# Patient Record
Sex: MALE | Race: WHITE | NOT HISPANIC OR LATINO | Employment: FULL TIME | URBAN - METROPOLITAN AREA
[De-identification: names, ages, dates, MRNs, and addresses within clinical notes are randomized per-mention and may not be internally consistent; named-entity substitution may affect disease eponyms.]

---

## 2020-01-03 DIAGNOSIS — IMO0001 HORMONAL IMBALANCE IN TRANSGENDER PATIENT: ICD-10-CM

## 2020-01-03 DIAGNOSIS — Z79.899 HIGH RISK MEDICATION USE: Primary | ICD-10-CM

## 2020-01-08 LAB
ALBUMIN SERPL-MCNC: 4.7 G/DL (ref 3.5–5.5)
ALBUMIN/GLOB SERPL: 2.5 {RATIO} (ref 1.2–2.2)
ALP SERPL-CCNC: 52 IU/L (ref 39–117)
ALT SERPL-CCNC: 11 IU/L (ref 0–44)
AST SERPL-CCNC: 14 IU/L (ref 0–40)
BILIRUB SERPL-MCNC: 0.4 MG/DL (ref 0–1.2)
BUN SERPL-MCNC: 12 MG/DL (ref 6–20)
BUN/CREAT SERPL: 14 (ref 9–20)
CALCIUM SERPL-MCNC: 9.1 MG/DL (ref 8.7–10.2)
CHLORIDE SERPL-SCNC: 102 MMOL/L (ref 96–106)
CHOLEST SERPL-MCNC: 173 MG/DL (ref 100–199)
CO2 SERPL-SCNC: 27 MMOL/L (ref 20–29)
CREAT SERPL-MCNC: 0.83 MG/DL (ref 0.76–1.27)
ERYTHROCYTE [DISTWIDTH] IN BLOOD BY AUTOMATED COUNT: 12.7 % (ref 11.6–15.4)
GLOBULIN SER CALC-MCNC: 1.9 G/DL (ref 1.5–4.5)
GLUCOSE SERPL-MCNC: 84 MG/DL (ref 65–99)
HCT VFR BLD AUTO: 42.3 % (ref 37.5–51)
HDLC SERPL-MCNC: 63 MG/DL
HGB BLD-MCNC: 14.6 G/DL (ref 13–17.7)
LDLC SERPL CALC-MCNC: 94 MG/DL (ref 0–99)
LDLC/HDLC SERPL: 1.5 RATIO (ref 0–3.6)
MCH RBC QN AUTO: 30.5 PG (ref 26.6–33)
MCHC RBC AUTO-ENTMCNC: 34.5 G/DL (ref 31.5–35.7)
MCV RBC AUTO: 89 FL (ref 79–97)
PLATELET # BLD AUTO: 237 X10E3/UL (ref 150–450)
POTASSIUM SERPL-SCNC: 3.9 MMOL/L (ref 3.5–5.2)
PROT SERPL-MCNC: 6.6 G/DL (ref 6–8.5)
RBC # BLD AUTO: 4.78 X10E6/UL (ref 4.14–5.8)
SODIUM SERPL-SCNC: 144 MMOL/L (ref 134–144)
TESTOST SERPL-MCNC: 547 NG/DL (ref 264–916)
TRIGL SERPL-MCNC: 79 MG/DL (ref 0–149)
VLDLC SERPL CALC-MCNC: 16 MG/DL (ref 5–40)
WBC # BLD AUTO: 5.6 X10E3/UL (ref 3.4–10.8)

## 2020-01-16 ENCOUNTER — OFFICE VISIT (OUTPATIENT)
Dept: FAMILY MEDICINE CLINIC | Facility: CLINIC | Age: 40
End: 2020-01-16

## 2020-01-16 VITALS
DIASTOLIC BLOOD PRESSURE: 62 MMHG | BODY MASS INDEX: 18.78 KG/M2 | WEIGHT: 110 LBS | SYSTOLIC BLOOD PRESSURE: 102 MMHG | OXYGEN SATURATION: 99 % | HEIGHT: 64 IN | RESPIRATION RATE: 16 BRPM | HEART RATE: 64 BPM

## 2020-01-16 DIAGNOSIS — IMO0001 HORMONAL IMBALANCE IN TRANSGENDER PATIENT: ICD-10-CM

## 2020-01-16 DIAGNOSIS — R06.83 SNORING: Primary | ICD-10-CM

## 2020-01-16 DIAGNOSIS — M25.552 PAIN OF LEFT HIP JOINT: ICD-10-CM

## 2020-01-16 PROBLEM — K21.9 GASTROESOPHAGEAL REFLUX DISEASE: Status: ACTIVE | Noted: 2020-01-16

## 2020-01-16 PROCEDURE — 99203 OFFICE O/P NEW LOW 30 MIN: CPT | Performed by: FAMILY MEDICINE

## 2020-01-16 RX ORDER — PANTOPRAZOLE SODIUM 40 MG/1
40 TABLET, DELAYED RELEASE ORAL DAILY
COMMUNITY
Start: 2015-06-29 | End: 2020-04-10 | Stop reason: SDUPTHER

## 2020-01-16 RX ORDER — MELATONIN 10 MG
CAPSULE ORAL
COMMUNITY
End: 2020-03-10

## 2020-01-16 RX ORDER — FINASTERIDE 5 MG/1
5 TABLET, FILM COATED ORAL DAILY
COMMUNITY
Start: 2018-06-18 | End: 2020-08-03 | Stop reason: SDUPTHER

## 2020-01-16 NOTE — PROGRESS NOTES
Alexander Khalil is a 39 y.o. male. Pt is a new pt for the clinic and actual  pt ans is here for Hormone therapy replacement     Chief Complaint   Patient presents with   • hormone therapy replacement       HPI     Here to establish care and to discuss couple issues.  Just got his dose of Depo testosterone this morning.  Typically gets 1 every 3 months or so.  Trying to figure out what to do next as he had [place].  We have wrestled with insurance to try to get them to [place] it [place] however we have not had any LOC.    Concerned about possible sleep apnea.  Has been told by his wife that he snores heavily and occasionally stops breathing overnight.  Is never had a sleep study.  Has fair amount of daily fatigue, rare morning headaches.  Does have short sleep latency.    Has had some recent low back pain related to the placement and removal of an InterStim device for chronic urinary urgency.  Has a low decent sized scar on his left buttock that hurts intermittently especially with extremes of hip flexion, like getting in and out of a car.  Feels it is slowly healing, though it has interfered with his walking routine.  Typically walks 1 hour every evening.  Is found this very beneficial to his mental health.    The following portions of the patient's history were reviewed and updated as appropriate: allergies, current medications, past family history, past medical history, past social history, past surgical history and problem list.    Review of Systems   Constitutional: Negative for chills, fatigue, fever and unexpected weight change.   HENT: Negative for sore throat.    Respiratory: Negative for cough, shortness of breath and wheezing.    Cardiovascular: Negative for chest pain, palpitations and leg swelling.   Gastrointestinal: Negative for abdominal distention, abdominal pain, constipation, diarrhea, nausea and vomiting.   Genitourinary: Positive for urgency.  Negative for dysuria.   Musculoskeletal: Negative for arthralgias and myalgias.   Skin: Positive for wound. Negative for rash.   Neurological: Negative for dizziness.   Psychiatric/Behavioral: Negative for confusion. The patient is nervous/anxious.      I have reviewed and confirmed the ROS as documented by the MA. Yossi Vincent MD    Objective  Vitals:    01/16/20 1144   BP: 102/62   Pulse: 64   Resp: 16   SpO2: 99%     Body mass index is 18.88 kg/m².    Physical Exam   Constitutional: He is oriented to person, place, and time. He appears well-developed and well-nourished. No distress.   Eyes: Pupils are equal, round, and reactive to light. EOM are normal.   Neck: Normal range of motion. Neck supple.   Cardiovascular: Normal rate, regular rhythm, normal heart sounds and intact distal pulses.   No murmur heard.  Pulmonary/Chest: Effort normal and breath sounds normal. No respiratory distress. He has no wheezes.   Abdominal: Soft. Bowel sounds are normal. There is no tenderness. There is no rebound and no guarding.   Musculoskeletal: Normal range of motion.   Slight tenderness lateral L hip, good ROM, strength intact   Neurological: He is alert and oriented to person, place, and time.   Skin: Skin is warm and dry.        Two well-healing surgical scars with surgical glue overlying, slight tenderness, no erythema or edema   Psychiatric: He has a normal mood and affect. His behavior is normal.   Nursing note and vitals reviewed.        Current Outpatient Medications:   •  finasteride (PROSCAR) 5 MG tablet, Take 5 mg by mouth Daily., Disp: , Rfl:   •  Melatonin 10 MG capsule, Take  by mouth., Disp: , Rfl:   •  pantoprazole (PROTONIX) 40 MG EC tablet, Take 40 mg by mouth Daily., Disp: , Rfl:   •  Testosterone Undecanoate 750 MG/3ML solution, Inject  into the appropriate muscle as directed by prescriber. Injects once every 3 months, Disp: , Rfl:   Current outpatient and discharge medications have been reconciled  for the patient.  Reviewed by: Yossi Vincent MD      Procedures    Lab Results (most recent)     None                  Alexander was seen today for hormone therapy replacement.    Diagnoses and all orders for this visit:    Snoring  -     Ambulatory Referral to Sleep Medicine    Hormonal imbalance in transgender patient    Pain of left hip joint    Orders as above.  Told him to expect someone helping him schedule that within a week or so.    Recommended slow return to activity.    May consider trying another prior authorization with insurance for double testosterone.    Any information loaded into the AVS was placed there by ARELIS Vincent MD, and patient was counseled on the same.   No follow-ups on file.      ARELIS Vincent MD

## 2020-03-04 ENCOUNTER — OFFICE VISIT (OUTPATIENT)
Dept: SLEEP MEDICINE | Facility: HOSPITAL | Age: 40
End: 2020-03-04

## 2020-03-04 VITALS
HEIGHT: 64 IN | DIASTOLIC BLOOD PRESSURE: 70 MMHG | HEART RATE: 63 BPM | SYSTOLIC BLOOD PRESSURE: 107 MMHG | WEIGHT: 116 LBS | BODY MASS INDEX: 19.81 KG/M2 | OXYGEN SATURATION: 98 %

## 2020-03-04 DIAGNOSIS — G47.30 SLEEP APNEA, UNSPECIFIED TYPE: Primary | ICD-10-CM

## 2020-03-04 DIAGNOSIS — G47.21 CIRCADIAN RHYTHM SLEEP DISORDER, DELAYED SLEEP PHASE TYPE: ICD-10-CM

## 2020-03-04 DIAGNOSIS — G47.10 HYPERSOMNIA: ICD-10-CM

## 2020-03-04 DIAGNOSIS — G47.8 NON-RESTORATIVE SLEEP: ICD-10-CM

## 2020-03-04 DIAGNOSIS — G47.53 RECURRENT ISOLATED SLEEP PARALYSIS: ICD-10-CM

## 2020-03-04 DIAGNOSIS — R06.83 SNORING: ICD-10-CM

## 2020-03-04 PROCEDURE — 99213 OFFICE O/P EST LOW 20 MIN: CPT | Performed by: FAMILY MEDICINE

## 2020-03-04 PROCEDURE — G0463 HOSPITAL OUTPT CLINIC VISIT: HCPCS

## 2020-03-04 NOTE — PROGRESS NOTES
[facility] New Patient/Consultation       Reason for Consultation: Snoring witnessed apneas      Patient Care Team:  Yossi Vincent MD as PCP - General (Family Medicine)  Suzan Roberts MD as Consulting Physician (Sleep Medicine)      History of present illness:  Thank you for asking me to see your patient.  The patient is a 39 y.o. male with past medical history of GERD presents today with concern for sleep disorder due to snoring and witnessed apneas.  Denies history of prior sleep study.  Uses melatonin to help him sleep at night sometimes.  No history of tonsillectomy.  Patient reports self diagnosed delayed circadian rhythm disorder.  He will naturally go to sleep later than most people around 1 AM or 2 AM and will naturally like to wake up later around 9 AM.  In the past he started adding walking to his routine and help reset her circadian rhythm where he was sleeping from 11 PM to 7 AM.  He recently had some shoulder issues/surgery and since then has been hard to reset his circadian rhythm.  He does have a light box which he does not use regularly.  He does note hypersomnia and nonrestorative sleep.  He can fall asleep pretty easily at any time.  Does report sudden episodes of sleep sometimes; he is falling asleep during place and shows that he was really enjoying.  He does report some sleep paralysis episodes.  He also reports when he has a lot of anxiety/panic attack feels like his knees buckle at times.  No family history of narcolepsy that he can recall.    Sleep Schedule:  Bed time: Varies usually 1 AM; weekends sometimes 2 AM  Sleep latency: 10 minutes  Wake time: Weekdays 7 AM to 8 AM; weekends 9 AM  Average hours slept: 6-7  Non-restorative sleep: Sometimes if less than 7 hours of sleep  Number of naps per day: 1  Rotating shifts?:  No  Nocturia: N  Electronics in bedroom: Y    Excessive daytime sleepiness or drowsiness:Y  Any accidents at work due to sleepiness in the last 5  "years:SOMETIMES  Any difficulty driving due to sleepiness or being drowsy: SOMETIMES  Weight changed in the last 5 years:Y - LOST 1 5 LBS    Snoring:Y  Witnessed apneas:Y  Have you ever awakened gasping for breath, coughing, choking or respiratory discomfort: N  Morning headaches: N  Awaken with a sore throat or dry mouth: N    Any reports of leg jerking at night: N  Urge sensations: N  Does pain disrupt sleep: N  Sweating during sleep: N  Teeth grinding: Y    Any sudden episodes of sleep during the day: N  Sleep paralysis/hallucinations: Y - PARALYSIS  Muscle weakness with laughing/anger: N  Nightmares: N  Sleep walking: N    Are you sleepy when you increase your sleep time: N  Do you sleep better away from your own bed: N    ESS: 6    Social History: Professor; 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; 2 caffeinated beverages a day    Review of Systems:    A complete review of systems was done and all were negative with the exception of frequent urination joint pain anxiety frequent heartburn    Allergies:  Fish-derived products; Sulfa antibiotics; and Latex    Family History: TWYLA no       Current Outpatient Medications:   •  finasteride (PROSCAR) 5 MG tablet, Take 5 mg by mouth Daily., Disp: , Rfl:   •  Melatonin 10 MG capsule, Take  by mouth., Disp: , Rfl:   •  pantoprazole (PROTONIX) 40 MG EC tablet, Take 40 mg by mouth Daily., Disp: , Rfl:   •  Testosterone Undecanoate 750 MG/3ML solution, Inject  into the appropriate muscle as directed by prescriber. Injects once every 3 months, Disp: , Rfl:     Vital Signs:    Vitals:    03/04/20 0820   BP: 107/70   Pulse: 63   SpO2: 98%   Weight: 52.6 kg (116 lb)   Height: 162.6 cm (64\")      Body mass index is 19.91 kg/m².  Neck Circumference: 12.5 inches      Physical Exam:   General Alert and oriented. No acute distress noted   Pharynx/Throat Class II Mallampati airway, large tongue, no evidence of redundant lateral pharyngeal tissue. No oral lesions. No thrush. Moist mucous " membranes.   Head Normocephalic. Symmetrical. Atraumatic.    Nose No septal deviation. No drainage   Chest Wall Normal shape. Symmetric expansion with respiration. No tenderness.   Neck Trachea midline, no thyromegaly or adenopathy    Lungs Clear to auscultation bilaterally. No wheezes. No rhonchi. No rales. Respirations regular, even and unlabored.   Heart Regular rhythm and normal rate. Normal S1 and S2. No murmur   Abdomen Soft, non-tender and non-distended. Normal bowel sounds. No masses.   Extremities Moves all extremities well. No edema   Psychiatric Normal mood and affect.       Impression:  1. Sleep apnea, unspecified type    2. Snoring    3. Non-restorative sleep    4. Hypersomnia    5. Recurrent isolated sleep paralysis    6. Circadian rhythm sleep disorder, delayed sleep phase type        Plan:    Good sleep hygiene measures should be maintained.      I discussed the pathophysiology of obstructive sleep apnea with the patient.  We discussed the adverse outcomes associated with untreated sleep-disordered breathing.  We discussed treatment modalities of obstructive sleep apnea including CPAP device as well as oral mandibular advancement device. Sleep study will be scheduled to establish definitive diagnosis of sleep disorder breathing.  Weight loss will be strongly beneficial in order to reduce the severity of sleep-disordered breathing.  Patient has narrow oropharyngeal structure.  Caution during activities that require prolonged concentration is strongly advised.  Patient will be notified of sleep study results after sleep study is completed.  If sleep apnea is only mild,  oral mandibular advancement device may be one of the treatment options.  However if sleep apnea is moderately severe, CPAP treatment will be strongly encouraged.  The patient is not opposed to treatment with CPAP device if we confirm significant obstructive sleep apnea on polysomnography.    Primary concern is snoring and witnessed  apneas.  Therefore we will proceed with home sleep study for now to rule out obstructive sleep apnea.  There is also component of his delayed type circadian rhythm disorder contributing to his symptoms.  Discussed resetting circadian rhythm with the help of melatonin and light box to adjust his core body temperature minimum.  Patient will work on this.    If sleep study negative for obstructive sleep apnea and still having difficulty with adjusting his circadian rhythm, will consider in lab M SLT.    Thank you for allowing me to participate in your patient's care.    Suzan Roberts MD  Sleep Medicine  03/04/20  9:00 AM

## 2020-03-10 RX ORDER — MELATONIN 10 MG
5 CAPSULE ORAL
COMMUNITY
End: 2022-12-29

## 2020-03-25 ENCOUNTER — APPOINTMENT (OUTPATIENT)
Dept: SLEEP MEDICINE | Facility: HOSPITAL | Age: 40
End: 2020-03-25

## 2020-04-10 DIAGNOSIS — IMO0001 HORMONAL IMBALANCE IN TRANSGENDER PATIENT: Primary | ICD-10-CM

## 2020-04-10 RX ORDER — PANTOPRAZOLE SODIUM 40 MG/1
40 TABLET, DELAYED RELEASE ORAL DAILY
Qty: 90 TABLET | Refills: 3 | Status: SHIPPED | OUTPATIENT
Start: 2020-04-10 | End: 2021-04-05

## 2020-04-16 ENCOUNTER — TELEPHONE (OUTPATIENT)
Dept: FAMILY MEDICINE CLINIC | Facility: CLINIC | Age: 40
End: 2020-04-16

## 2020-04-27 ENCOUNTER — TELEPHONE (OUTPATIENT)
Dept: FAMILY MEDICINE CLINIC | Facility: CLINIC | Age: 40
End: 2020-04-27

## 2020-04-27 NOTE — TELEPHONE ENCOUNTER
I'm confused. We filled out a PA, right? Does that mean they filled one dose and are requiring another PA for the next dose? Can you please contact his insurance to figure out exactly what's needed?

## 2020-04-27 NOTE — TELEPHONE ENCOUNTER
MIGUEL FROM [facility] CALLING AND STATES THE Testosterone Enanthate (Xyosted) 50 MG/0.5ML solution auto-injector WILL NEED A PRIOR AUTH BEFORE THE NEXT REFILL.     MIGUEL CAN BE REACHED [phone].

## 2020-08-03 RX ORDER — FINASTERIDE 5 MG/1
5 TABLET, FILM COATED ORAL DAILY
Qty: 90 TABLET | Refills: 3 | Status: SHIPPED | OUTPATIENT
Start: 2020-08-03 | End: 2021-07-29

## 2020-08-19 DIAGNOSIS — IMO0001 HORMONAL IMBALANCE IN TRANSGENDER PATIENT: ICD-10-CM

## 2020-11-05 DIAGNOSIS — Z20.822 EXPOSURE TO COVID-19 VIRUS: Primary | ICD-10-CM

## 2021-02-09 ENCOUNTER — OFFICE VISIT (OUTPATIENT)
Dept: FAMILY MEDICINE CLINIC | Facility: CLINIC | Age: 41
End: 2021-02-09

## 2021-02-09 VITALS
RESPIRATION RATE: 14 BRPM | TEMPERATURE: 96.9 F | HEIGHT: 64 IN | BODY MASS INDEX: 21 KG/M2 | HEART RATE: 97 BPM | WEIGHT: 123 LBS | DIASTOLIC BLOOD PRESSURE: 84 MMHG | SYSTOLIC BLOOD PRESSURE: 128 MMHG

## 2021-02-09 DIAGNOSIS — F42.2 MIXED OBSESSIONAL THOUGHTS AND ACTS: ICD-10-CM

## 2021-02-09 DIAGNOSIS — IMO0001 HORMONAL IMBALANCE IN TRANSGENDER PATIENT: Primary | ICD-10-CM

## 2021-02-09 DIAGNOSIS — Z51.81 THERAPEUTIC DRUG MONITORING: ICD-10-CM

## 2021-02-09 DIAGNOSIS — F41.1 GENERALIZED ANXIETY DISORDER: ICD-10-CM

## 2021-02-09 PROCEDURE — 99214 OFFICE O/P EST MOD 30 MIN: CPT | Performed by: FAMILY MEDICINE

## 2021-02-09 RX ORDER — KETOCONAZOLE 20 MG/ML
SHAMPOO TOPICAL
COMMUNITY
Start: 2020-12-28

## 2021-02-09 RX ORDER — CETIRIZINE HYDROCHLORIDE 10 MG/1
TABLET ORAL
COMMUNITY
Start: 2015-10-01

## 2021-02-09 NOTE — PROGRESS NOTES
"Chief Complaint  Medication Problem    Subjective    History of Present Illness {CC  Problem List  Visit  Diagnosis   Encounters  Notes  Medications  Labs  Result Review Imaging  Media :23}     Alexander Khalil presents to [facility] for Medication Problem.  History of Present Illness     Here today for general follow-up.  Due for a check of labs today.  Feels that he is doing well overall.  Anxiety has been somewhat high.  Has also noticed more obsessive thoughts of late.  These seem most focused around money and hygiene.  Have been various other topics in the past.  Seeing a therapist, finds it useful, but current therapist does not have modalities to necessarily help with OCD issues.      Has been on escitalopram in the past but this caused some urinary retention which compounded existing urinary problems.  Has not been on any other medication since that time.  Has been somewhat reluctant to retry though acknowledges that the mirabegron seems to be helping much more than any previous therapies.    Continues on Xyosted for gender affirming hormone therapy.  Tolerating it well, no problems with injections.  Due for labs as above.    Objective     Vital Signs:   /84   Pulse 97   Temp 96.9 °F (36.1 °C)   Resp 14   Ht 162 cm (63.78\")   Wt 55.8 kg (123 lb)   BMI 21.26 kg/m²   Physical Exam  Vitals signs and nursing note reviewed.   Constitutional:       General: He is not in acute distress.     Appearance: Normal appearance. He is not ill-appearing.   Cardiovascular:      Rate and Rhythm: Normal rate and regular rhythm.      Pulses: Normal pulses.      Heart sounds: Normal heart sounds. No murmur.   Pulmonary:      Effort: Pulmonary effort is normal. No respiratory distress.      Breath sounds: Normal breath sounds. No rales.   Neurological:      Mental Status: He is alert and oriented to person, place, and time. Mental status is at baseline.   Psychiatric:         Mood " and Affect: Mood normal.         Behavior: Behavior normal.          Result Review  Data Reviewed:{ Labs  Result Review  Imaging  Med Tab  Media :23}                   Assessment and Plan {CC Problem List  Visit Diagnosis  ROS  Review (Popup)  Health Maintenance  Quality  BestPractice  Medications  SmartSets  SnapShot Encounters  Media :23}   Diagnoses and all orders for this visit:    1. Hormonal imbalance in transgender patient (Primary)  -     Testosterone  -     CBC (No Diff)  -     Lipid Panel With LDL / HDL Ratio    2. Therapeutic drug monitoring  -     Testosterone  -     CBC (No Diff)  -     Lipid Panel With LDL / HDL Ratio    3. Mixed obsessional thoughts and acts    4. Generalized anxiety disorder    Orders as above.  I will contact him with results and will discuss any necessary changes to his regimen at that time.    Discussed various options for treatment of anxiety and OCD symptoms.  Recommended going back on a low-dose of escitalopram as he tolerated well in the past.  Can start with half of his current dose and we can adjust from there.    Encouraged follow-up as below.  Recommended continued communication via [employer] in the meantime.      Follow Up {Instructions Charge Capture  Follow-up Communications :23}     Patient was given instructions and counseling regarding his condition or for health maintenance advice. Please see specific information pulled into the AVS (placed there by myself) if appropriate.    Return in about 1 month (around 3/9/2021), or if symptoms worsen or fail to improve.      ARELIS Vincent MD

## 2021-02-10 PROBLEM — F42.2 MIXED OBSESSIONAL THOUGHTS AND ACTS: Status: ACTIVE | Noted: 2021-02-10

## 2021-02-10 PROBLEM — F41.1 GENERALIZED ANXIETY DISORDER: Status: ACTIVE | Noted: 2021-02-10

## 2021-02-20 DIAGNOSIS — R06.83 SNORING: Primary | ICD-10-CM

## 2021-02-20 DIAGNOSIS — G47.00 INSOMNIA, UNSPECIFIED TYPE: ICD-10-CM

## 2021-02-25 RX ORDER — FLUVOXAMINE MALEATE 25 MG
25 TABLET ORAL NIGHTLY
Qty: 30 TABLET | Refills: 1 | Status: SHIPPED | OUTPATIENT
Start: 2021-02-25 | End: 2021-03-22 | Stop reason: SDUPTHER

## 2021-03-03 DIAGNOSIS — G47.8 NON-RESTORATIVE SLEEP: ICD-10-CM

## 2021-03-03 DIAGNOSIS — G47.30 SLEEP APNEA, UNSPECIFIED TYPE: Primary | ICD-10-CM

## 2021-03-03 DIAGNOSIS — G47.21 CIRCADIAN RHYTHM SLEEP DISORDER, DELAYED SLEEP PHASE TYPE: ICD-10-CM

## 2021-03-03 DIAGNOSIS — G47.10 HYPERSOMNIA: ICD-10-CM

## 2021-03-03 DIAGNOSIS — G47.53 RECURRENT ISOLATED SLEEP PARALYSIS: ICD-10-CM

## 2021-03-03 DIAGNOSIS — R06.83 SNORING: ICD-10-CM

## 2021-03-11 ENCOUNTER — HOSPITAL ENCOUNTER (OUTPATIENT)
Dept: SLEEP MEDICINE | Facility: HOSPITAL | Age: 41
Discharge: HOME | End: 2021-03-11
Admitting: FAMILY MEDICINE

## 2021-03-11 DIAGNOSIS — R06.83 SNORING: ICD-10-CM

## 2021-03-11 DIAGNOSIS — G47.10 HYPERSOMNIA: ICD-10-CM

## 2021-03-11 DIAGNOSIS — G47.8 NON-RESTORATIVE SLEEP: ICD-10-CM

## 2021-03-11 DIAGNOSIS — G47.53 RECURRENT ISOLATED SLEEP PARALYSIS: ICD-10-CM

## 2021-03-11 DIAGNOSIS — G47.30 SLEEP APNEA, UNSPECIFIED TYPE: ICD-10-CM

## 2021-03-11 DIAGNOSIS — G47.21 CIRCADIAN RHYTHM SLEEP DISORDER, DELAYED SLEEP PHASE TYPE: ICD-10-CM

## 2021-03-11 PROCEDURE — 95806 SLEEP STUDY UNATT&RESP EFFT: CPT

## 2021-03-12 PROCEDURE — 95806 SLEEP STUDY UNATT&RESP EFFT: CPT | Performed by: FAMILY MEDICINE

## 2021-03-22 RX ORDER — FLUVOXAMINE MALEATE 25 MG
25 TABLET ORAL NIGHTLY
Qty: 90 TABLET | Refills: 1 | Status: SHIPPED | OUTPATIENT
Start: 2021-03-22 | End: 2022-02-01

## 2021-03-26 RX ORDER — MIRABEGRON 50 MG/1
TABLET, FILM COATED, EXTENDED RELEASE ORAL
Qty: 90 TABLET | Refills: 3 | Status: SHIPPED | OUTPATIENT
Start: 2021-03-26 | End: 2022-03-01

## 2021-03-30 ENCOUNTER — TELEPHONE (OUTPATIENT)
Dept: SLEEP MEDICINE | Facility: HOSPITAL | Age: 41
End: 2021-03-30

## 2021-03-30 NOTE — TELEPHONE ENCOUNTER
Called pt with sleep study results and faxed orders to [facility] on 3/30/2021.  F/u appt scheduled on 6/2/2021 @ 1:30 with Dr. Roberts.  [facility]   25-Apr-2019 21:38

## 2021-04-08 ENCOUNTER — TELEPHONE (OUTPATIENT)
Dept: SLEEP MEDICINE | Facility: HOSPITAL | Age: 41
End: 2021-04-08

## 2021-04-08 NOTE — TELEPHONE ENCOUNTER
Pt called with complaints about his pressures, feeling like it was too much and waking up from air. Tech advised pt to try FF mask and contacted DME to set up mask fitting and to call pt. Tech pulled download report and sent message to Dr. Roberts.

## 2021-04-13 ENCOUNTER — TELEPHONE (OUTPATIENT)
Dept: SLEEP MEDICINE | Facility: HOSPITAL | Age: 41
End: 2021-04-13

## 2021-04-13 NOTE — TELEPHONE ENCOUNTER
Spoke with pt about adjusting pressures. Pt stated things have been better last 2 nights and he would like to continue with current pressures for now.

## 2021-04-16 ENCOUNTER — BULK ORDERING (OUTPATIENT)
Dept: CASE MANAGEMENT | Facility: OTHER | Age: 41
End: 2021-04-16

## 2021-04-16 DIAGNOSIS — Z23 IMMUNIZATION DUE: ICD-10-CM

## 2021-05-05 DIAGNOSIS — IMO0001 HORMONAL IMBALANCE IN TRANSGENDER PATIENT: ICD-10-CM

## 2021-05-05 PROBLEM — F42.9 OBSESSIVE-COMPULSIVE DISORDER: Status: ACTIVE | Noted: 2021-03-02

## 2021-05-05 RX ORDER — TESTOSTERONE ENANTHATE 100 MG/.5ML
100 INJECTION SUBCUTANEOUS
Qty: 3 PEN | Refills: 5 | Status: SHIPPED | OUTPATIENT
Start: 2021-05-05 | End: 2021-11-03

## 2021-05-06 ENCOUNTER — TELEPHONE (OUTPATIENT)
Dept: FAMILY MEDICINE CLINIC | Facility: CLINIC | Age: 41
End: 2021-05-06

## 2021-05-06 NOTE — TELEPHONE ENCOUNTER
[facility] CALLED TO CHECK IF A PRIOR AUTHORIZATION FOR Testosterone Enanthate (Xyosted) 100 MG/0.5ML solution auto-injector WAS RECEIVED

## 2021-05-12 NOTE — TELEPHONE ENCOUNTER
OSMIN WITH [facility] CALLED TO CHECK ON PRIOR AUTHORIZATION. SHE ASKED IF PRIOR AUTHORIZATION IS IN THE APPEALS PROCESS.     SHE ASKED IF SOMEONE COULD CALL HER. SHE CAN BE REACHED [phone].

## 2021-05-24 RX ORDER — PANTOPRAZOLE SODIUM 40 MG/1
TABLET, DELAYED RELEASE ORAL
Qty: 90 TABLET | Refills: 3 | Status: SHIPPED | OUTPATIENT
Start: 2021-05-24 | End: 2022-05-19

## 2021-06-02 ENCOUNTER — OFFICE VISIT (OUTPATIENT)
Dept: SLEEP MEDICINE | Facility: HOSPITAL | Age: 41
End: 2021-06-02

## 2021-06-02 VITALS
OXYGEN SATURATION: 99 % | DIASTOLIC BLOOD PRESSURE: 82 MMHG | HEART RATE: 83 BPM | SYSTOLIC BLOOD PRESSURE: 119 MMHG | BODY MASS INDEX: 21 KG/M2 | HEIGHT: 64 IN | WEIGHT: 123 LBS

## 2021-06-02 DIAGNOSIS — G47.33 OBSTRUCTIVE SLEEP APNEA: Primary | ICD-10-CM

## 2021-06-02 PROCEDURE — G0463 HOSPITAL OUTPT CLINIC VISIT: HCPCS

## 2021-06-02 PROCEDURE — 99213 OFFICE O/P EST LOW 20 MIN: CPT | Performed by: FAMILY MEDICINE

## 2021-06-02 NOTE — PROGRESS NOTES
Follow Up Sleep Disorders Center Note     Chief Complaint:  TWYLA     Primary Care Physician: Yossi Vincent MD    Alexander Khalil is a 40 y.o.male  was last seen at [facility] sleep lab: 3/15/2021 for home sleep study which showed overall AHI of 6.2 events per hour.  Supine AHI 9.3.  Recommended auto CPAP 6-15 cm H2O.  Patient then called about pressure being too high; he was advised to try full facemask. Presents today for first follow-up visit.  Some improvement; pressure feels high at times.  Getting used to mask sensation; getting used to sounds for machine as well.  Can seem loud at times.    Results Review:  DME is [facility].  Downloads between 3/3/2021-5/31/2021.  Average usage is 6 hours 26 minutes.  Average AHI is 2.1.  Average AutoCPAP pressure is 10.4 cm H2O.    Current Medications:    Current Outpatient Medications:   •  cetirizine (zyrTEC) 10 MG tablet, , Disp: , Rfl:   •  finasteride (PROSCAR) 5 MG tablet, Take 1 tablet by mouth Daily for 360 days., Disp: 90 tablet, Rfl: 3  •  fluvoxaMINE (LUVOX) 25 MG tablet, Take 1 tablet by mouth Every Night., Disp: 90 tablet, Rfl: 1  •  ketoconazole (NIZORAL) 2 % shampoo, APPLY TO FACE DAILY, LET SIT FOR 5 MINUTES THEN RINSE, Disp: , Rfl:   •  Melatonin 10 MG capsule, Take 5 mg by mouth., Disp: , Rfl:   •  Myrbetriq 50 MG tablet sustained-release 24 hour 24 hr tablet, TAKE 1 TABLET (50 MG) BY MOUTH DAILY, Disp: 90 tablet, Rfl: 3  •  pantoprazole (PROTONIX) 40 MG EC tablet, TAKE 1 TABLET DAILY, Disp: 90 tablet, Rfl: 3  •  Testosterone Enanthate (Xyosted) 100 MG/0.5ML solution auto-injector, Inject 100 mg under the skin into the appropriate area as directed Every 10 (Ten) Days., Disp: 3 pen, Rfl: 5   also entered in Sleep Questionnaire    Patient  has a past medical history of Anxiety, Compulsive disorder, Depression, Gender dysphoria, GERD (gastroesophageal reflux disease), and Neurogenic bladder.    Social History:    Social History     Socioeconomic History   • Marital  "status:      Spouse name: Not on file   • Number of children: Not on file   • Years of education: Not on file   • Highest education level: Not on file   Tobacco Use   • Smoking status: Never Smoker   • Smokeless tobacco: Never Used   Substance and Sexual Activity   • Alcohol use: Never   • Drug use: Never   • Sexual activity: Not Currently       Allergies:  Fish-derived products, Sulfa antibiotics, and Latex    Review of Systems:    A complete review of systems was done and all were negative with the exception of anxiety acid reflux    Vital Signs:    Vitals:    06/02/21 1348   BP: 119/82   Pulse: 83   SpO2: 99%   Weight: 55.8 kg (123 lb)   Height: 161.3 cm (63.5\")     Body mass index is 21.45 kg/m².    Vital Signs /82   Pulse 83   Ht 161.3 cm (63.5\")   Wt 55.8 kg (123 lb)   SpO2 99%   BMI 21.45 kg/m²  Body mass index is 21.45 kg/m².    General Alert and oriented. No acute distress noted   Pharynx/Throat Class II Mallampati airway, large tongue, no evidence of redundant lateral pharyngeal tissue. No oral lesions. No thrush. Moist mucous membranes.   Head Normocephalic. Symmetrical. Atraumatic.    Nose No septal deviation. No drainage   Chest Wall Normal shape. Symmetric expansion with respiration. No tenderness.   Neck Trachea midline, no thyromegaly or adenopathy    Lungs Clear to auscultation bilaterally. No wheezes. No rhonchi. No rales. Respirations regular, even and unlabored.   Heart Regular rhythm and normal rate. Normal S1 and S2. No murmur   Abdomen Soft, non-tender and non-distended. Normal bowel sounds. No masses.   Extremities Moves all extremities well. No edema   Psychiatric Normal mood and affect.     Impression:  1. Obstructive sleep apnea        Obstructive sleep apnea adequately treated with auto CPAP 6-15 cm H2O with good compliance and usage and no complaints of hypersomnolence.  Change to set pressure of 9 cm H2O.  Return to clinic in 2 months for follow-up or sooner if " needed.    Patient uses the CPAP device and benefits from its use in terms of reduction of hypersomnia and snoring.Weight loss will be strongly beneficial to reduce the severity of sleep-disordered breathing.  Caution during activities that require prolonged concentration is strongly advised if sleepiness returns. Changing of PAP supplies regularly is important for effective use. Patient needs to change cushion on the mask or plugs on nasal pillows along with disposable filters once every month and change mask frame, tubing, headgear and Velcro straps every 6 months at the minimum.    Time spent during visit: 20 minutes of which at least 50% of the time was spent counseling patient.    Suzan Roberts MD  Sleep Medicine  06/02/21  14:01 EDT

## 2021-06-10 DIAGNOSIS — R10.32 GROIN PAIN, LEFT: Primary | ICD-10-CM

## 2021-07-21 ENCOUNTER — OFFICE VISIT (OUTPATIENT)
Dept: SLEEP MEDICINE | Facility: HOSPITAL | Age: 41
End: 2021-07-21

## 2021-07-21 VITALS
DIASTOLIC BLOOD PRESSURE: 87 MMHG | WEIGHT: 125 LBS | OXYGEN SATURATION: 98 % | BODY MASS INDEX: 21.34 KG/M2 | HEART RATE: 67 BPM | SYSTOLIC BLOOD PRESSURE: 125 MMHG | HEIGHT: 64 IN

## 2021-07-21 DIAGNOSIS — G47.33 OBSTRUCTIVE SLEEP APNEA: Primary | ICD-10-CM

## 2021-07-21 PROCEDURE — 99213 OFFICE O/P EST LOW 20 MIN: CPT | Performed by: FAMILY MEDICINE

## 2021-07-21 PROCEDURE — G0463 HOSPITAL OUTPT CLINIC VISIT: HCPCS

## 2021-07-21 NOTE — PROGRESS NOTES
Follow Up [facility] Disorders Center Note     Chief Complaint:  TWYLA     Primary Care Physician: Yossi Vincent MD    Alexander Khalil is a 40 y.o.male  was last seen at [facility] sleep lab: 6/2/2021. Home sleep study March 2021 showed overall AHI 6.2 supine AHI of 9.3. Recommend auto CPAP 6/15 cm H2O; patient then called about pressure being too high and he was advised to try full facemask. At first follow-up visit there was some improvement he noted however pressure would feel high at times. He was still getting used to mask sensation sounds from the machine as well. At last visit we changed to a set pressure of 9 cm H2O. Presents today for 1 month follow-up.  No problems with mask machine hypersomnia or nonrestorative sleep.    Results Review:  DME is [facility].  Downloads between 6/20/2021-7/19/2021.  Average usage is 6 hours 41 minutes.  Average AHI is 1.7.  Average AutoCPAP pressure is 9 cm H2O.    Current Medications:    Current Outpatient Medications:   •  cetirizine (zyrTEC) 10 MG tablet, , Disp: , Rfl:   •  finasteride (PROSCAR) 5 MG tablet, Take 1 tablet by mouth Daily for 360 days., Disp: 90 tablet, Rfl: 3  •  fluvoxaMINE (LUVOX) 25 MG tablet, Take 1 tablet by mouth Every Night., Disp: 90 tablet, Rfl: 1  •  ketoconazole (NIZORAL) 2 % shampoo, APPLY TO FACE DAILY, LET SIT FOR 5 MINUTES THEN RINSE, Disp: , Rfl:   •  Melatonin 10 MG capsule, Take 5 mg by mouth., Disp: , Rfl:   •  Myrbetriq 50 MG tablet sustained-release 24 hour 24 hr tablet, TAKE 1 TABLET (50 MG) BY MOUTH DAILY, Disp: 90 tablet, Rfl: 3  •  pantoprazole (PROTONIX) 40 MG EC tablet, TAKE 1 TABLET DAILY, Disp: 90 tablet, Rfl: 3  •  Testosterone Enanthate (Xyosted) 100 MG/0.5ML solution auto-injector, Inject 100 mg under the skin into the appropriate area as directed Every 10 (Ten) Days., Disp: 3 pen, Rfl: 5   also entered in Sleep Questionnaire    Patient  has a past medical history of Anxiety, Compulsive disorder, Depression, Gender dysphoria, GERD  "(gastroesophageal reflux disease), and Neurogenic bladder.    Social History:    Social History     Socioeconomic History   • Marital status:      Spouse name: Not on file   • Number of children: Not on file   • Years of education: Not on file   • Highest education level: Not on file   Tobacco Use   • Smoking status: Never Smoker   • Smokeless tobacco: Never Used   Substance and Sexual Activity   • Alcohol use: Never   • Drug use: Never   • Sexual activity: Not Currently       Allergies:  Fish-derived products, Sulfa antibiotics, and Latex    Review of Systems:    A complete review of systems was done and all were negative with the exception of reflux anxiety    Vital Signs:    Vitals:    07/21/21 1500   BP: 125/87   Pulse: 67   SpO2: 98%   Weight: 56.7 kg (125 lb)   Height: 162.6 cm (64\")     Body mass index is 21.46 kg/m².    Vital Signs /87   Pulse 67   Ht 162.6 cm (64\")   Wt 56.7 kg (125 lb)   SpO2 98%   BMI 21.46 kg/m²  Body mass index is 21.46 kg/m².    General Alert and oriented. No acute distress noted   Pharynx/Throat Class II Mallampati airway, large tongue, no evidence of redundant lateral pharyngeal tissue. No oral lesions. No thrush. Moist mucous membranes.   Head Normocephalic. Symmetrical. Atraumatic.    Nose No septal deviation. No drainage   Chest Wall Normal shape. Symmetric expansion with respiration. No tenderness.   Neck Trachea midline, no thyromegaly or adenopathy    Lungs Clear to auscultation bilaterally. No wheezes. No rhonchi. No rales. Respirations regular, even and unlabored.   Heart Regular rhythm and normal rate. Normal S1 and S2. No murmur   Abdomen Soft, non-tender and non-distended. Normal bowel sounds. No masses.   Extremities Moves all extremities well. No edema   Psychiatric Normal mood and affect.     Impression:  1. Obstructive sleep apnea        Obstructive sleep apnea adequately treated with CPAP 9 cm H2O with good compliance and usage and no complaints of " hypersomnolence.  Return to clinic in 6 months for follow-up or sooner if needed.    Patient uses the CPAP device and benefits from its use in terms of reduction of hypersomnia and snoring.Weight loss will be strongly beneficial to reduce the severity of sleep-disordered breathing.  Caution during activities that require prolonged concentration is strongly advised if sleepiness returns. Changing of PAP supplies regularly is important for effective use. Patient needs to change cushion on the mask or plugs on nasal pillows along with disposable filters once every month and change mask frame, tubing, headgear and Velcro straps every 6 months at the minimum.    Time spent during visit: 20 minutes of which at least 50% of the time was spent counseling patient.    Suzan Roberts MD  Sleep Medicine  07/21/21  17:07 EDT

## 2021-08-12 ENCOUNTER — DOCUMENTATION (OUTPATIENT)
Dept: FAMILY MEDICINE CLINIC | Facility: CLINIC | Age: 41
End: 2021-08-12

## 2021-08-12 RX ORDER — OLOPATADINE HYDROCHLORIDE 2 MG/ML
1 SOLUTION/ DROPS OPHTHALMIC DAILY
Qty: 30 EACH | Refills: 1 | Status: SHIPPED | OUTPATIENT
Start: 2021-08-12 | End: 2021-09-07

## 2021-09-07 RX ORDER — OLOPATADINE HYDROCHLORIDE 2 MG/ML
1 SOLUTION/ DROPS OPHTHALMIC DAILY
Qty: 2.5 ML | Refills: 1 | Status: SHIPPED | OUTPATIENT
Start: 2021-09-07

## 2021-10-21 ENCOUNTER — OFFICE VISIT (OUTPATIENT)
Dept: FAMILY MEDICINE CLINIC | Facility: CLINIC | Age: 41
End: 2021-10-21

## 2021-10-21 VITALS
BODY MASS INDEX: 21.28 KG/M2 | RESPIRATION RATE: 16 BRPM | SYSTOLIC BLOOD PRESSURE: 120 MMHG | HEART RATE: 83 BPM | DIASTOLIC BLOOD PRESSURE: 68 MMHG | OXYGEN SATURATION: 96 % | WEIGHT: 124 LBS

## 2021-10-21 DIAGNOSIS — G40.802: Primary | ICD-10-CM

## 2021-10-21 PROBLEM — I38 HEART VALVE REGURGITATION: Status: ACTIVE | Noted: 2021-10-21

## 2021-10-21 PROBLEM — J34.9 DISORDER OF PARANASAL SINUS: Status: ACTIVE | Noted: 2021-10-21

## 2021-10-21 PROBLEM — K58.0 IRRITABLE BOWEL SYNDROME WITH DIARRHEA: Status: ACTIVE | Noted: 2017-09-08

## 2021-10-21 PROCEDURE — 99213 OFFICE O/P EST LOW 20 MIN: CPT | Performed by: FAMILY MEDICINE

## 2021-10-21 NOTE — PROGRESS NOTES
Chief Complaint  Seizures    Subjective    History of Present Illness {  Problem List  Visit  Diagnosis   Encounters  Notes  Medications  Labs  Result Review Imaging  Media :23}     Alexander Khalil presents to [facility] for Seizures.  History of Present Illness     Here to discuss some concerning seizure activity he is noticed of late. Reports that this is been going on at some level or another since approximately age 16. Has some brief shaking spells typically brought on by intense emotions, most often either after being extremely upset or after orgasm. Has a new sexual partner of late, has been having much more sexual activity than usual. Has had an increase in these episodes. Denies any loss of consciousness, no loss of bowel or bladder function. Does have some possible post ictal. After the episodes. They have been witnessed, partner believes they may be related to temporal seizures. He has never had any EEG for evaluation, is interested in seeing a neurologist for further evaluation.    Objective     Vital Signs:   /68   Pulse 83   Resp 16   Wt 56.2 kg (124 lb)   SpO2 96%   BMI 21.28 kg/m²   Physical Exam  Vitals and nursing note reviewed.   Constitutional:       General: He is not in acute distress.     Appearance: Normal appearance. He is not ill-appearing.   Cardiovascular:      Rate and Rhythm: Normal rate and regular rhythm.      Pulses: Normal pulses.      Heart sounds: Normal heart sounds. No murmur heard.      Pulmonary:      Effort: Pulmonary effort is normal. No respiratory distress.      Breath sounds: Normal breath sounds. No rales.   Neurological:      General: No focal deficit present.      Mental Status: He is alert and oriented to person, place, and time. Mental status is at baseline.      Cranial Nerves: No cranial nerve deficit.      Sensory: No sensory deficit.      Motor: No weakness.      Gait: Gait normal.   Psychiatric:         Mood and  Affect: Mood normal.         Behavior: Behavior normal.          Result Review  Data Reviewed:{ Labs  Result Review  Imaging  Med Tab  Media :23}                   Assessment and Plan {CC Problem List  Visit Diagnosis  ROS  Review (Popup)  Health Maintenance  Quality  BestPractice  Medications  SmartSets  SnapShot Encounters  Media :23}   Diagnoses and all orders for this visit:    1. Orgasm-induced epilepsy (HCC) (Primary)  -     Ambulatory Referral to Neurology    Referral as above. Encouraged to keep me updated with any new or different symptoms.    Recommended follow-up as below. Encouraged communication via [employer] meantime.      Follow Up {Instructions Charge Capture  Follow-up Communications :23}     Patient was given instructions and counseling regarding his condition or for health maintenance advice. Please see specific information pulled into the AVS (placed there by myself) if appropriate.    Return in about 6 months (around 4/21/2022), or if symptoms worsen or fail to improve.      ARELIS Vincent MD

## 2021-11-02 DIAGNOSIS — IMO0001 HORMONAL IMBALANCE IN TRANSGENDER PATIENT: ICD-10-CM

## 2021-11-03 RX ORDER — TESTOSTERONE ENANTHATE 100 MG/.5ML
INJECTION SUBCUTANEOUS
Qty: 1.5 ML | Refills: 5 | Status: SHIPPED | OUTPATIENT
Start: 2021-11-03 | End: 2022-01-09

## 2021-11-05 RX ORDER — POLYMYXIN B SULFATE AND TRIMETHOPRIM 1; 10000 MG/ML; [USP'U]/ML
1 SOLUTION OPHTHALMIC EVERY 4 HOURS
Qty: 10 ML | Refills: 0 | Status: SHIPPED | OUTPATIENT
Start: 2021-11-05 | End: 2022-02-01

## 2021-11-29 RX ORDER — FLUCONAZOLE 150 MG/1
150 TABLET ORAL ONCE
Qty: 2 TABLET | Refills: 1 | Status: SHIPPED | OUTPATIENT
Start: 2021-11-29 | End: 2021-11-29

## 2021-12-02 RX ORDER — CIPROFLOXACIN 500 MG/1
500 TABLET, FILM COATED ORAL 2 TIMES DAILY
Qty: 14 TABLET | Refills: 0 | Status: SHIPPED | OUTPATIENT
Start: 2021-12-02 | End: 2022-01-05

## 2021-12-03 DIAGNOSIS — U07.1 LAB TEST POSITIVE FOR DETECTION OF COVID-19 VIRUS: Primary | ICD-10-CM

## 2022-01-05 ENCOUNTER — OFFICE VISIT (OUTPATIENT)
Dept: FAMILY MEDICINE CLINIC | Facility: CLINIC | Age: 42
End: 2022-01-05

## 2022-01-05 VITALS
BODY MASS INDEX: 21.51 KG/M2 | OXYGEN SATURATION: 99 % | HEART RATE: 68 BPM | WEIGHT: 126 LBS | HEIGHT: 64 IN | DIASTOLIC BLOOD PRESSURE: 62 MMHG | RESPIRATION RATE: 16 BRPM | SYSTOLIC BLOOD PRESSURE: 118 MMHG

## 2022-01-05 DIAGNOSIS — Z13.6 ENCOUNTER FOR LIPID SCREENING FOR CARDIOVASCULAR DISEASE: ICD-10-CM

## 2022-01-05 DIAGNOSIS — R30.0 DYSURIA: ICD-10-CM

## 2022-01-05 DIAGNOSIS — Z51.81 THERAPEUTIC DRUG MONITORING: ICD-10-CM

## 2022-01-05 DIAGNOSIS — Z00.00 ROUTINE HEALTH MAINTENANCE: Primary | ICD-10-CM

## 2022-01-05 DIAGNOSIS — Z13.1 SCREENING FOR DIABETES MELLITUS: ICD-10-CM

## 2022-01-05 DIAGNOSIS — Z13.220 ENCOUNTER FOR LIPID SCREENING FOR CARDIOVASCULAR DISEASE: ICD-10-CM

## 2022-01-05 DIAGNOSIS — Z11.59 ENCOUNTER FOR HEPATITIS C SCREENING TEST FOR LOW RISK PATIENT: ICD-10-CM

## 2022-01-05 PROCEDURE — 99396 PREV VISIT EST AGE 40-64: CPT | Performed by: FAMILY MEDICINE

## 2022-01-05 NOTE — PROGRESS NOTES
"Chief Complaint  Annual Exam    Subjective    History of Present Illness {CC  Problem List  Visit  Diagnosis   Encounters  Notes  Medications  Labs  Result Review Imaging  Media :23}     Alexander Khalil presents to [facility] for Annual Exam.  History of Present Illness     Here today for annual exam and to discuss preventive health maintenance.    Overall considers himself fairly healthy. Reports good adherence and tolerance to his current med regimen. No need for refills at this time. Would like to do some routine blood work today.    Has noticed some dysuria off and on for the last several weeks, would like to get a urine evaluation and culture today. Has recent history of some BV that is since resolved with treatment. Active with a new partner, believes this may contribute to the current symptoms.    Continues on Xyosted for gender affirming hormone therapy. No problems with self administration. Does occasionally have some injection site reactions with small lumps the take a while to resolve. Due for check of levels today.    Has good family and social support. Currently in the process of  from long-term wife. Stable diet and exercise regimen. Gets regular dental checks.    Objective     Vital Signs:   /62   Pulse 68   Resp 16   Ht 162.6 cm (64\")   Wt 57.2 kg (126 lb)   SpO2 99%   BMI 21.63 kg/m²   Physical Exam  Vitals and nursing note reviewed.   Constitutional:       General: He is not in acute distress.     Appearance: Normal appearance. He is not ill-appearing.   Cardiovascular:      Rate and Rhythm: Normal rate and regular rhythm.      Pulses: Normal pulses.      Heart sounds: Normal heart sounds. No murmur heard.      Pulmonary:      Effort: Pulmonary effort is normal. No respiratory distress.      Breath sounds: Normal breath sounds. No rales.   Neurological:      Mental Status: He is alert and oriented to person, place, and time. Mental status is " at baseline.   Psychiatric:         Mood and Affect: Mood normal.         Behavior: Behavior normal.          Result Review  Data Reviewed:{ Labs  Result Review  Imaging  Med Tab  Media :23}                   Assessment and Plan {CC Problem List  Visit Diagnosis  ROS  Review (Popup)  Health Maintenance  Quality  BestPractice  Medications  SmartSets  SnapShot Encounters  Media :23}   Diagnoses and all orders for this visit:    1. Routine health maintenance (Primary)    2. Dysuria  -     Urinalysis With Microscopic - Urine, Clean Catch; Future  -     Urine Culture - Urine, Urine, Clean Catch; Future    3. Screening for diabetes mellitus  -     Comprehensive Metabolic Panel    4. Encounter for lipid screening for cardiovascular disease  -     Lipid Panel    5. Encounter for hepatitis C screening test for low risk patient  -     Hepatitis C Antibody    6. Therapeutic drug monitoring  -     CBC & Differential  -     Testosterone    Orders as above. I will contact him with results as available. Continue regimen as prescribed for now.    Recommended follow-up as below. Encouraged communication via [employer] in the meantime.      Follow Up {Instructions Charge Capture  Follow-up Communications :23}     Patient was given instructions and counseling regarding his condition or for health maintenance advice. Please see specific information pulled into the AVS (placed there by myself) if appropriate.    Return in about 6 months (around 7/5/2022), or if symptoms worsen or fail to improve.      ARELIS Vincent MD    Prevention counseling performed as below: Mindfulness for stress management.

## 2022-01-07 LAB
ALBUMIN SERPL-MCNC: 4.7 G/DL (ref 4–5)
ALBUMIN/GLOB SERPL: 1.9 {RATIO} (ref 1.2–2.2)
ALP SERPL-CCNC: 65 IU/L (ref 44–121)
ALT SERPL-CCNC: 18 IU/L (ref 0–44)
AST SERPL-CCNC: 17 IU/L (ref 0–40)
BASOPHILS # BLD AUTO: 0.1 X10E3/UL (ref 0–0.2)
BASOPHILS NFR BLD AUTO: 1 %
BILIRUB SERPL-MCNC: 0.7 MG/DL (ref 0–1.2)
BUN SERPL-MCNC: 11 MG/DL (ref 6–24)
BUN/CREAT SERPL: 12 (ref 9–20)
CALCIUM SERPL-MCNC: 9.4 MG/DL (ref 8.7–10.2)
CHLORIDE SERPL-SCNC: 99 MMOL/L (ref 96–106)
CHOLEST SERPL-MCNC: 226 MG/DL (ref 100–199)
CO2 SERPL-SCNC: 27 MMOL/L (ref 20–29)
CREAT SERPL-MCNC: 0.95 MG/DL (ref 0.76–1.27)
EOSINOPHIL # BLD AUTO: 0.1 X10E3/UL (ref 0–0.4)
EOSINOPHIL NFR BLD AUTO: 2 %
ERYTHROCYTE [DISTWIDTH] IN BLOOD BY AUTOMATED COUNT: 12.5 % (ref 11.6–15.4)
GLOBULIN SER CALC-MCNC: 2.5 G/DL (ref 1.5–4.5)
GLUCOSE SERPL-MCNC: 131 MG/DL (ref 65–99)
HCT VFR BLD AUTO: 47.8 % (ref 37.5–51)
HCV AB S/CO SERPL IA: <0.1 S/CO RATIO (ref 0–0.9)
HDLC SERPL-MCNC: 65 MG/DL
HGB BLD-MCNC: 16.1 G/DL (ref 13–17.7)
IMM GRANULOCYTES # BLD AUTO: 0 X10E3/UL (ref 0–0.1)
IMM GRANULOCYTES NFR BLD AUTO: 0 %
LDLC SERPL CALC-MCNC: 148 MG/DL (ref 0–99)
LYMPHOCYTES # BLD AUTO: 2 X10E3/UL (ref 0.7–3.1)
LYMPHOCYTES NFR BLD AUTO: 41 %
MCH RBC QN AUTO: 30 PG (ref 26.6–33)
MCHC RBC AUTO-ENTMCNC: 33.7 G/DL (ref 31.5–35.7)
MCV RBC AUTO: 89 FL (ref 79–97)
MONOCYTES # BLD AUTO: 0.6 X10E3/UL (ref 0.1–0.9)
MONOCYTES NFR BLD AUTO: 11 %
NEUTROPHILS # BLD AUTO: 2.2 X10E3/UL (ref 1.4–7)
NEUTROPHILS NFR BLD AUTO: 45 %
PLATELET # BLD AUTO: 212 X10E3/UL (ref 150–450)
POTASSIUM SERPL-SCNC: 4 MMOL/L (ref 3.5–5.2)
PROT SERPL-MCNC: 7.2 G/DL (ref 6–8.5)
RBC # BLD AUTO: 5.37 X10E6/UL (ref 4.14–5.8)
SODIUM SERPL-SCNC: 140 MMOL/L (ref 134–144)
TESTOST SERPL-MCNC: 1180 NG/DL (ref 264–916)
TRIGL SERPL-MCNC: 75 MG/DL (ref 0–149)
VLDLC SERPL CALC-MCNC: 13 MG/DL (ref 5–40)
WBC # BLD AUTO: 5 X10E3/UL (ref 3.4–10.8)

## 2022-01-09 DIAGNOSIS — IMO0001 HORMONAL IMBALANCE IN TRANSGENDER PATIENT: ICD-10-CM

## 2022-01-09 RX ORDER — TESTOSTERONE ENANTHATE 75 MG/.5ML
75 INJECTION SUBCUTANEOUS
Qty: 9 PEN | Refills: 1 | Status: SHIPPED | OUTPATIENT
Start: 2022-01-09 | End: 2022-06-15 | Stop reason: SDUPTHER

## 2022-01-13 RX ORDER — METRONIDAZOLE 500 MG/1
500 TABLET ORAL 2 TIMES DAILY
Qty: 14 TABLET | Refills: 0 | Status: SHIPPED | OUTPATIENT
Start: 2022-01-13 | End: 2022-02-01

## 2022-01-26 ENCOUNTER — APPOINTMENT (OUTPATIENT)
Dept: SLEEP MEDICINE | Facility: HOSPITAL | Age: 42
End: 2022-01-26

## 2022-01-28 DIAGNOSIS — M54.2 CHRONIC NECK AND BACK PAIN: ICD-10-CM

## 2022-01-28 DIAGNOSIS — G89.29 CHRONIC NECK AND BACK PAIN: ICD-10-CM

## 2022-01-28 DIAGNOSIS — M54.9 CHRONIC NECK AND BACK PAIN: ICD-10-CM

## 2022-01-28 DIAGNOSIS — M25.552 PAIN OF LEFT HIP JOINT: Primary | ICD-10-CM

## 2022-02-01 ENCOUNTER — OFFICE VISIT (OUTPATIENT)
Dept: NEUROLOGY | Facility: CLINIC | Age: 42
End: 2022-02-01

## 2022-02-01 VITALS
WEIGHT: 123.57 LBS | DIASTOLIC BLOOD PRESSURE: 70 MMHG | HEART RATE: 88 BPM | OXYGEN SATURATION: 98 % | SYSTOLIC BLOOD PRESSURE: 122 MMHG | BODY MASS INDEX: 21.1 KG/M2 | HEIGHT: 64 IN

## 2022-02-01 DIAGNOSIS — R41.3 MEMORY LOSS: ICD-10-CM

## 2022-02-01 DIAGNOSIS — R56.9 CONVULSIONS, UNSPECIFIED CONVULSION TYPE: ICD-10-CM

## 2022-02-01 DIAGNOSIS — G62.9 POLYNEUROPATHY: ICD-10-CM

## 2022-02-01 DIAGNOSIS — F44.5 PSYCHOGENIC NONEPILEPTIC SEIZURE: Primary | ICD-10-CM

## 2022-02-01 PROCEDURE — 99204 OFFICE O/P NEW MOD 45 MIN: CPT | Performed by: PSYCHIATRY & NEUROLOGY

## 2022-02-01 NOTE — PROGRESS NOTES
Subjective:     Patient ID: Alexander Khalil is a 41 y.o. male.    The patient is a 41-year-old right-handed transgender male with a history of allergies, carpal tunnel syndrome, headaches, OCD, sleep apnea on CPAP, and trigeminal neuralgia who is seen in consultation request of Dr. Vincent for the evaluation possible seizures.  I reviewed the patient records.  I reviewed the referring providers note from October 21, 2021.  He reports that the patient was being seen for possible seizure activity.  It has been going on since age 16.  The patient has brief shaking spells brought on by intense emotions most often by being extremely upset or after orgasm.  The patient's partner believes that they could be temporal seizures.  The patient had a home sleep study on March 15, 2021 that showed mild sleep apnea with an AHI of 6.  The patient reports that he will convulse when he gets highly stressed.  He has a lot of twitches when he gets stressed or nervous around his head and neck.  These may progress and involve his whole body.  This started around age 16.  He will curl up and have shaking of his whole body particularly of the upper body.  The same thing happens when he orgasms.  He will stop responding.  He reports that a last about 10 minutes.  He is aware without loss of consciousness but has trouble responding.  Afterwards he is tired.  He had 1-2 in January but it could happen up to 4-5 times a week.  He denies any injuries from these episodes.  He denies any tongue biting or urinary incontinence.  They are more likely to occur at night.  He tries to fight the symptoms and this will make them last longer.  He has not been on any medications for this.  In the past he has establish care with a psychiatrist but has not seen one in a while.  He does have a therapist but has not seen them in about a month.  He reports that he did have neuropsychological testing done 10 years ago in the [place].  He was diagnosed with stress-related  memory problems.  He thinks he had a brain MRI in 2014 or 2015.  He reports that they were evaluating him for MS.  He is not sure where it was done.  He is also not sure the results.  Of note, he has an adopted daughter with epilepsy.  He also has tingling in his hands and feet.  He could not tolerate an EMG in the past.  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  He also has concerns for cognitive issues.  When he gets stressed he is unable to function from a brain perspective and may lose his words.  The patient was concerned about the confidentiality of his note and therefore I have worked with the epic team to make it private.  He reports that he has a history of trauma and does not want other providers to have access to his medical records and to know intimate details about him.  He reported that the visit was stressful for him.    The following portions of the patient's history were reviewed and updated as appropriate: allergies, current medications, past family history, past medical history, past social history, past surgical history and problem list.    Review of Systems     Objective:    Neurologic Exam    Physical Exam   **The patient is wearing a mask**  Constitutional:  Vital signs reviewed.  No apparent distress.  Well groomed.  Eyes:  No injection, no icterus.    Respiratory:  Normal effort.  Clear to auscultation bilaterally.  Cardiovascular:  Regular rate and rhythm.  No murmurs.  No carotid bruits. Symmetric radial pulses.  Musculoskeletal: Normal station.  Gait steady.  Normal arm swing.  Patient able to walk on heels and toes.  Tandem gait intact.  Romberg negative.  Muscle tone and bulk normal in the bilateral upper and lower extremities.  Strength is 5/5 in the bilateral upper and lower extremities proximally and distally unless otherwise specified in the neurological exam.  Skin:  No rashes.  Warm, dry, and intact.  Psychiatric:  Good mood.   Normal affect.    Neurologic:  Mental status-  The patient is alert and oriented to person, place and time. Attention/concentration is within normal limits.  Speech is fluent without dysarthria.  The patient is able to name, repeat and follow complex commands without difficulty.  Immediate memory and delayed recall intact (3/3 words immediate and after 4 minutes).  Fund of knowledge normal.  Cranial nerves- Pupils equally round and reactive to light with intact accomodation.  Visual fields intact.  Extraocular movements intact.  Facial sensation intact.   Hearing intact to finger-rub bilaterally.  SCM and trapezius are 5/5 bilaterally.    Motor-  See musculoskeletal above.  No tremor.  Reflexes- 2+ in the bilateral biceps, brachioradialis, patellar and achilles.  Toes down-going bilaterally.  Sensation- Intact to pinprick and vibration in bilateral upper and lower extremities symmetrically.  Coordination- Intact to finger tapping and heel knee shin bilaterally.   Gait- See musculoskeletal exam above.       Assessment/Plan:    The patient is a 41-year-old right-handed transgender male with a history of allergies, carpal tunnel syndrome, headaches, OCD, sleep apnea, and trigeminal neuralgia who presents to the neurology clinic today for the evaluation of transient shaking.    1.  Psychogenic nonepileptic spells-the patient's clinical presentation seems most consistent with this diagnosis.  We discussed that this is a diagnosis of exclusion and for definitive diagnosis I recommend spell characterization in the epilepsy monitoring unit.  The patient voiced that he would much rather have this done at home which is not unreasonable.  Then later at the end of the visit he decided against that test and preferred a routine EEG.  We did discuss that routine EEGs are not particularly sensitive and may not be the most appropriate test for his symptoms.  I strongly urged him to check out the website neurosymptoms.org as I think  that he does have a functional neurological disorder.  He was agreeable to seeing Dr. Bermudez at the [facility].  He does have numbness and tingling which could reflect neuropathy.  He was unable to tolerate EMGs in the past.  We can check some blood work today to evaluate for possible causes of neuropathy.       Problems Addressed this Visit     None      Visit Diagnoses     Psychogenic nonepileptic seizure    -  Primary    Relevant Orders    EEG (Completed)    Ambulatory Referral to Neurology (Completed)    Memory loss        Relevant Orders    Vitamin B6    Vitamin B12    TSH Rfx On Abnormal To Free T4    Sjogren's Antibody, Anti-SS-A / -SS-B    Protein Elec + Interp, Serum    Immunofixation, Serum    Hemoglobin A1c    Heavy Metals, Blood    Folate    Cryoglobulin    EEG (Completed)    Polyneuropathy        Relevant Orders    Vitamin B6    Vitamin B12    TSH Rfx On Abnormal To Free T4    Sjogren's Antibody, Anti-SS-A / -SS-B    Protein Elec + Interp, Serum    Immunofixation, Serum    Hemoglobin A1c    Heavy Metals, Blood    Folate    Cryoglobulin    Convulsions, unspecified convulsion type (HCC)        Relevant Orders    EEG (Completed)      Diagnoses       Codes Comments    Psychogenic nonepileptic seizure    -  Primary ICD-10-CM: F44.5  ICD-9-CM: 300.11     Memory loss     ICD-10-CM: R41.3  ICD-9-CM: 780.93     Polyneuropathy     ICD-10-CM: G62.9  ICD-9-CM: 356.9     Convulsions, unspecified convulsion type (HCC)     ICD-10-CM: R56.9  ICD-9-CM: 780.39

## 2022-02-08 ENCOUNTER — PATIENT ROUNDING (BHMG ONLY) (OUTPATIENT)
Dept: NEUROLOGY | Facility: CLINIC | Age: 42
End: 2022-02-08

## 2022-02-10 ENCOUNTER — HOSPITAL ENCOUNTER (OUTPATIENT)
Dept: NEUROLOGY | Facility: HOSPITAL | Age: 42
Discharge: HOME | End: 2022-02-10
Admitting: PSYCHIATRY & NEUROLOGY

## 2022-02-10 ENCOUNTER — APPOINTMENT (OUTPATIENT)
Dept: NEUROLOGY | Facility: HOSPITAL | Age: 42
End: 2022-02-10

## 2022-02-10 DIAGNOSIS — F44.5 PSYCHOGENIC NONEPILEPTIC SEIZURE: ICD-10-CM

## 2022-02-10 DIAGNOSIS — R41.3 MEMORY LOSS: ICD-10-CM

## 2022-02-10 DIAGNOSIS — R56.9 CONVULSIONS, UNSPECIFIED CONVULSION TYPE: ICD-10-CM

## 2022-02-10 PROCEDURE — 95813 EEG EXTND MNTR 61-119 MIN: CPT | Performed by: PSYCHIATRY & NEUROLOGY

## 2022-02-10 PROCEDURE — 95813 EEG EXTND MNTR 61-119 MIN: CPT

## 2022-02-16 ENCOUNTER — OFFICE VISIT (OUTPATIENT)
Dept: SLEEP MEDICINE | Facility: HOSPITAL | Age: 42
End: 2022-02-16

## 2022-02-16 VITALS
BODY MASS INDEX: 21.95 KG/M2 | DIASTOLIC BLOOD PRESSURE: 74 MMHG | HEIGHT: 64 IN | SYSTOLIC BLOOD PRESSURE: 122 MMHG | HEART RATE: 82 BPM | WEIGHT: 128.6 LBS | OXYGEN SATURATION: 98 %

## 2022-02-16 DIAGNOSIS — G47.33 OBSTRUCTIVE SLEEP APNEA: Primary | ICD-10-CM

## 2022-02-16 PROCEDURE — G0463 HOSPITAL OUTPT CLINIC VISIT: HCPCS

## 2022-02-16 PROCEDURE — 99213 OFFICE O/P EST LOW 20 MIN: CPT | Performed by: FAMILY MEDICINE

## 2022-02-16 NOTE — PROGRESS NOTES
Follow Up Sleep Disorders Center Note     Chief Complaint:  TWYLA     Primary Care Physician: Yossi Vincent MD    Alexander Khalil is a 41 y.o.male  was last seen at [facility] sleep lab: 7/21/2021. Home sleep study March 2021 showed AHI 6.2 supine AHI 9.3. Recommended auto CPAP 6-15 cm H2O. Patient had noted pressure felt too high at times. We changed to set pressure 9 cm H2O. Last visit was doing well with no problems with mask machine hypersomnia or nonrestorative sleep. Advised 6-month follow-up.  Goes bed around midnight to 2 AM wakes up at 8 AM sleep strength 6 hours.  ESS of 10.  Uses nasal pillows mask.  No air leak fits well no dry mouth.  Decrease sleep hours recently however working on improving.    Results Review:  DME is [facility].  Downloads between 1/16/2022-2/14/2022.  Average usage is 4 hours 55 minutes.  Average AHI is 1.6.  Average AutoCPAP pressure is 9 cm H2O.    Current Medications:    Current Outpatient Medications:   •  cetirizine (zyrTEC) 10 MG tablet, , Disp: , Rfl:   •  fluticasone (VERAMYST) 27.5 MCG/SPRAY nasal spray, 2 sprays into the nostril(s) as directed by provider Daily., Disp: 5.9 mL, Rfl: 12  •  ketoconazole (NIZORAL) 2 % shampoo, APPLY TO FACE DAILY, LET SIT FOR 5 MINUTES THEN RINSE, Disp: , Rfl:   •  Melatonin 10 MG capsule, Take 5 mg by mouth., Disp: , Rfl:   •  Myrbetriq 50 MG tablet sustained-release 24 hour 24 hr tablet, TAKE 1 TABLET (50 MG) BY MOUTH DAILY, Disp: 90 tablet, Rfl: 3  •  olopatadine (PATADAY) 0.2 % solution ophthalmic solution, ADMINISTER 1 DROP TO BOTH EYES DAILY., Disp: 2.5 mL, Rfl: 1  •  pantoprazole (PROTONIX) 40 MG EC tablet, TAKE 1 TABLET DAILY, Disp: 90 tablet, Rfl: 3  •  Testosterone Enanthate (Xyosted) 75 MG/0.5ML solution auto-injector, Inject 75 mg under the skin into the appropriate area as directed Every 10 (Ten) Days., Disp: 9 pen, Rfl: 1   also entered in Sleep Questionnaire    Patient  has a past medical history of Anxiety, Compulsive disorder,  "Depression, Gender dysphoria, GERD (gastroesophageal reflux disease), Headache (2010), History of medical problems (2001), Irritable bowel syndrome (2007), and Neurogenic bladder.    Social History:    Social History     Socioeconomic History   • Marital status: Unknown   Tobacco Use   • Smoking status: Never Smoker   • Smokeless tobacco: Never Used   Substance and Sexual Activity   • Alcohol use: Not Currently     Alcohol/week: 0.0 standard drinks   • Drug use: Never   • Sexual activity: Yes     Partners: Female, Male     Birth control/protection: Condom       Allergies:  Fish-derived products, Sulfa antibiotics, and Latex    Vital Signs:    Vitals:    02/16/22 0900   BP: 122/74   Pulse: 82   SpO2: 98%   Weight: 58.3 kg (128 lb 9.6 oz)   Height: 162.6 cm (64\")     Body mass index is 22.07 kg/m².    REVIEW OF SYSTEMS.  Full review of systems available on the intake form which is scanned in the media tab.  The relevant positive are noted below  1. Daytime excessive sleepiness with [place] Sleepiness Scale :Total score: 10   2. Snoring  3. Heartburn anxiety      Physical exam:  Vitals:    02/16/22 0900   BP: 122/74   Pulse: 82   SpO2: 98%   Weight: 58.3 kg (128 lb 9.6 oz)   Height: 162.6 cm (64\")    Body mass index is 22.07 kg/m².    HEENT: Head is atraumatic, normocephalic  Eyes: pupils are round equal and reacting to light and accommodation, conjunctiva normal  Nose: no nasal septal defects or deviation and the nasal passages are clear, no nasal polyps,  Throat: tongue normal, oral airway Mallampati class 2  NECK: , trachea is in the midline, thyroid not enlarged  RESPIRATORY SYSTEM: Breath sounds are equal on both sides, there are no wheezes   CARDIOVASULAR SYSTEM: Heart sounds are regular rhythm and rachna rate, no edema  EXTREMITES: No cyanosis, clubbing  NEUROLOGICAL SYSTEM: Oriented x 3, no gross motor defects, gait normal    Impression:  1. Obstructive sleep apnea        Obstructive sleep apnea adequately " treated with CPAP 9 cm H2O with good compliance and usage and no complaints of hypersomnolence.  Return to clinic in 1 year for follow-up or sooner if needed.    Patient uses the CPAP device and benefits from its use in terms of reduction of hypersomnia and snoring.Weight loss will be strongly beneficial to reduce the severity of sleep-disordered breathing.  Caution during activities that require prolonged concentration is strongly advised if sleepiness returns. Changing of PAP supplies regularly is important for effective use. Patient needs to change cushion on the mask or plugs on nasal pillows along with disposable filters once every month and change mask frame, tubing, headgear and Velcro straps every 6 months at the minimum.      Suzan Roberts MD  Sleep Medicine  02/16/22  10:19 EST

## 2022-02-17 ENCOUNTER — DOCUMENTATION (OUTPATIENT)
Dept: NEUROLOGY | Facility: CLINIC | Age: 42
End: 2022-02-17

## 2022-02-17 NOTE — PROGRESS NOTES
I called the patient this afternoon to discuss the results of his EEG. Unfortunately there was no answer. I will try again next week. Due to the nonspecific findings, I would like to do a more prolonged 48 to 72-hour ambulatory study. I also want to update him that I have been working with IT and I have been able to make his note private.

## 2022-02-25 ENCOUNTER — DOCUMENTATION (OUTPATIENT)
Dept: NEUROLOGY | Facility: CLINIC | Age: 42
End: 2022-02-25

## 2022-02-25 NOTE — PROGRESS NOTES
I reviewed the patient's EEG results with him over the phone last night. I recommend a 48-72 at home video EEG. The patient would like to hold off at this time due to potential financial constraints. He has a follow-up with Dr. Bermudez at the [facility] next month. He is going away to see his opinion.  Length of phone call: approximately 16 minutes.

## 2022-03-01 RX ORDER — MIRABEGRON 50 MG/1
TABLET, FILM COATED, EXTENDED RELEASE ORAL
Qty: 90 TABLET | Refills: 3 | Status: SHIPPED | OUTPATIENT
Start: 2022-03-01 | End: 2022-06-29 | Stop reason: SDUPTHER

## 2022-03-15 ENCOUNTER — OFFICE VISIT (OUTPATIENT)
Dept: FAMILY MEDICINE CLINIC | Facility: CLINIC | Age: 42
End: 2022-03-15

## 2022-03-15 VITALS
HEIGHT: 64 IN | RESPIRATION RATE: 18 BRPM | OXYGEN SATURATION: 100 % | SYSTOLIC BLOOD PRESSURE: 118 MMHG | DIASTOLIC BLOOD PRESSURE: 68 MMHG | BODY MASS INDEX: 21.85 KG/M2 | HEART RATE: 77 BPM | WEIGHT: 128 LBS

## 2022-03-15 DIAGNOSIS — F90.9 ADULT ADHD: Primary | ICD-10-CM

## 2022-03-15 PROCEDURE — 99213 OFFICE O/P EST LOW 20 MIN: CPT | Performed by: FAMILY MEDICINE

## 2022-03-15 RX ORDER — DEXTROAMPHETAMINE SACCHARATE, AMPHETAMINE ASPARTATE MONOHYDRATE, DEXTROAMPHETAMINE SULFATE AND AMPHETAMINE SULFATE 5; 5; 5; 5 MG/1; MG/1; MG/1; MG/1
20 CAPSULE, EXTENDED RELEASE ORAL EVERY MORNING
Qty: 30 CAPSULE | Refills: 0 | Status: SHIPPED | OUTPATIENT
Start: 2022-03-15 | End: 2022-03-22

## 2022-03-15 NOTE — PROGRESS NOTES
"Chief Complaint  ADD    Subjective    History of Present Illness {CC  Problem List  Visit  Diagnosis   Encounters  Notes  Medications  Labs  Result Review Imaging  Media :23}     Alexander Khalil presents to [facility] for ADD.  History of Present Illness     Here today to discuss management of adult ADHD. Has never been on medications for this, but feels that now is time a. Has tried a variety of antidepressants in the past, has not tolerated them well. Has some chronic problems with urinary hesitancy that are often exacerbated by meds with anticholinergic effects. Has never tried a stimulant, is interested in what 1 might do for him. Currently feels scattered and like he has a hard time maintaining focus.    Objective     Vital Signs:   /68   Pulse 77   Resp 18   Ht 162.6 cm (64\")   Wt 58.1 kg (128 lb)   SpO2 100%   BMI 21.97 kg/m²   Physical Exam  Vitals and nursing note reviewed.   Constitutional:       General: He is not in acute distress.     Appearance: Normal appearance. He is not ill-appearing.   Cardiovascular:      Rate and Rhythm: Normal rate and regular rhythm.      Pulses: Normal pulses.      Heart sounds: Normal heart sounds. No murmur heard.  Pulmonary:      Effort: Pulmonary effort is normal. No respiratory distress.      Breath sounds: Normal breath sounds. No rales.   Neurological:      Mental Status: He is alert and oriented to person, place, and time. Mental status is at baseline.   Psychiatric:         Mood and Affect: Mood normal.         Behavior: Behavior normal.          Result Review  Data Reviewed:{ Labs  Result Review  Imaging  Med Tab  Media :23}                   Assessment and Plan {CC Problem List  Visit Diagnosis  ROS  Review (Popup)  Health Maintenance  Quality  BestPractice  Medications  SmartSets  SnapShot Encounters  Media :23}   Diagnoses and all orders for this visit:    1. Adult ADHD (Primary)  -     " amphetamine-dextroamphetamine XR (Adderall XR) 20 MG 24 hr capsule; Take 1 capsule by mouth Every Morning  Dispense: 30 capsule; Refill: 0    Discussed various options at length. Decided to try long-acting amphetamine-dextroamphetamine as above. Discussed anticipated effects, possible side effects. He will keep me updated as to his progress and we will discuss further management moving forward.    Recommend follow-up as below. Encouraged communication via [employer] meantime.      Follow Up {Instructions Charge Capture  Follow-up Communications :23}     Patient was given instructions and counseling regarding his condition or for health maintenance advice. Please see specific information pulled into the AVS (placed there by myself) if appropriate.    Return in about 3 months (around 6/15/2022) for f/u ADHD.      ARELIS Vincent MD

## 2022-03-18 ENCOUNTER — DOCUMENTATION (OUTPATIENT)
Dept: FAMILY MEDICINE CLINIC | Facility: CLINIC | Age: 42
End: 2022-03-18

## 2022-03-18 RX ORDER — BUPROPION HYDROCHLORIDE 150 MG/1
150 TABLET ORAL DAILY
Qty: 90 TABLET | Refills: 0 | Status: SHIPPED | OUTPATIENT
Start: 2022-03-18 | End: 2022-03-22

## 2022-03-22 ENCOUNTER — OFFICE VISIT (OUTPATIENT)
Dept: FAMILY MEDICINE CLINIC | Facility: CLINIC | Age: 42
End: 2022-03-22

## 2022-03-22 VITALS
HEIGHT: 64 IN | HEART RATE: 94 BPM | OXYGEN SATURATION: 98 % | BODY MASS INDEX: 21.97 KG/M2 | DIASTOLIC BLOOD PRESSURE: 70 MMHG | SYSTOLIC BLOOD PRESSURE: 120 MMHG

## 2022-03-22 DIAGNOSIS — J02.9 SORE THROAT: Primary | ICD-10-CM

## 2022-03-22 DIAGNOSIS — R09.81 NASAL CONGESTION: ICD-10-CM

## 2022-03-22 DIAGNOSIS — R52 BODY ACHES: ICD-10-CM

## 2022-03-22 LAB
EXPIRATION DATE: NORMAL
EXPIRATION DATE: NORMAL
FLUAV AG NPH QL: NEGATIVE
FLUBV AG NPH QL: NEGATIVE
INTERNAL CONTROL: NORMAL
INTERNAL CONTROL: NORMAL
Lab: NORMAL
Lab: NORMAL
S PYO AG THROAT QL: NEGATIVE

## 2022-03-22 PROCEDURE — 87804 INFLUENZA ASSAY W/OPTIC: CPT | Performed by: NURSE PRACTITIONER

## 2022-03-22 PROCEDURE — 99213 OFFICE O/P EST LOW 20 MIN: CPT | Performed by: NURSE PRACTITIONER

## 2022-03-22 PROCEDURE — 87880 STREP A ASSAY W/OPTIC: CPT | Performed by: NURSE PRACTITIONER

## 2022-03-22 RX ORDER — FINASTERIDE 5 MG/1
5 TABLET, FILM COATED ORAL DAILY
COMMUNITY
End: 2022-04-19

## 2022-03-22 RX ORDER — FLUTICASONE PROPIONATE 50 MCG
2 SPRAY, SUSPENSION (ML) NASAL DAILY
COMMUNITY

## 2022-03-22 NOTE — PATIENT INSTRUCTIONS
May continue tylenol over the counter  May take Delsym for cough  Call or return to clinic prn if these symptoms worsen or fail to improve as anticipated.

## 2022-03-22 NOTE — PROGRESS NOTES
Elsy Khalil is a 41 y.o. male.     History of Present Illness   Patient presents with c/o sore throat and cough that started two days ago. He reports that his wife and daughter has been sick with a similar illness and that his daughter tested negative for strep. He reports that he's taken tylenol over counter as directed.  Patient denies any fever or shortness of breath, but does admit to body aches.     The following portions of the patient's history were reviewed and updated as appropriate: allergies, current medications, past family history, past medical history, past social history, past surgical history and problem list.    Review of Systems   Constitutional: Negative for appetite change, chills, fatigue and fever.   HENT: Positive for congestion, postnasal drip and sore throat. Negative for ear pain, rhinorrhea, sinus pressure and sneezing.    Eyes: Negative for redness and itching.   Respiratory: Positive for cough. Negative for chest tightness, shortness of breath and wheezing.    Cardiovascular: Negative for chest pain, palpitations and leg swelling.   Musculoskeletal: Positive for myalgias.   Allergic/Immunologic: Negative.    Neurological: Positive for headache. Negative for dizziness.       Objective   Physical Exam  Vitals and nursing note reviewed.   Constitutional:       Appearance: He is well-developed.   HENT:      Head: Normocephalic and atraumatic.      Right Ear: Ear canal and external ear normal. A middle ear effusion is present.      Left Ear: Ear canal and external ear normal. A middle ear effusion is present.      Nose: Nose normal.      Right Sinus: No maxillary sinus tenderness or frontal sinus tenderness.      Left Sinus: No maxillary sinus tenderness or frontal sinus tenderness.      Mouth/Throat:      Lips: Pink.      Mouth: Mucous membranes are moist.      Tongue: No lesions.      Palate: No mass.      Pharynx: Oropharynx is clear. Posterior oropharyngeal erythema present.  No oropharyngeal exudate.      Tonsils: No tonsillar exudate.   Eyes:      General:         Right eye: No discharge.         Left eye: No discharge.      Conjunctiva/sclera: Conjunctivae normal.      Pupils: Pupils are equal, round, and reactive to light.   Neck:      Thyroid: No thyromegaly.   Cardiovascular:      Rate and Rhythm: Normal rate and regular rhythm.      Heart sounds: Normal heart sounds. No murmur heard.  Pulmonary:      Effort: Pulmonary effort is normal. No tachypnea or respiratory distress.      Breath sounds: Normal breath sounds. No decreased breath sounds, wheezing or rales.   Musculoskeletal:      Cervical back: Normal range of motion and neck supple.   Lymphadenopathy:      Cervical: No cervical adenopathy.   Skin:     General: Skin is warm and dry.   Neurological:      Mental Status: He is alert and oriented to person, place, and time.   Psychiatric:         Behavior: Behavior normal.         Thought Content: Thought content normal.         Judgment: Judgment normal.         Vitals:    03/22/22 1026   BP: 120/70   Pulse: 94   SpO2: 98%     Body mass index is 21.97 kg/m².    Procedures    Assessment/Plan   Problems Addressed this Visit    None     Visit Diagnoses     Sore throat    -  Primary    Relevant Orders    POCT rapid strep A (Completed)    Nasal congestion        Relevant Orders    COVID-19,[facility] ROUTINE, NP/OP SWAB IN TRANSPORT MEDIA OR ESWAB 72 HR TAT - Swab, Nasopharynx    POC Influenza A / B (Completed)    Body aches        Relevant Orders    COVID-19,[facility] ROUTINE, NP/OP SWAB IN TRANSPORT MEDIA OR ESWAB 72 HR TAT - Swab, Nasopharynx    POC Influenza A / B (Completed)      Diagnoses       Codes Comments    Sore throat    -  Primary ICD-10-CM: J02.9  ICD-9-CM: 462     Nasal congestion     ICD-10-CM: R09.81  ICD-9-CM: 478.19     Body aches     ICD-10-CM: R52  ICD-9-CM: 780.96         POCT Strep was negative  POCT flu was negative  Covid test pending  May continue tylenol over the  counter  May take Delsym for cough         Return if symptoms worsen or fail to improve.

## 2022-03-23 LAB
LABCORP SARS-COV-2, NAA 2 DAY TAT: NORMAL
SARS-COV-2 RNA RESP QL NAA+PROBE: DETECTED

## 2022-04-19 RX ORDER — FINASTERIDE 5 MG/1
TABLET, FILM COATED ORAL
Qty: 90 TABLET | Refills: 3 | Status: SHIPPED | OUTPATIENT
Start: 2022-04-19

## 2022-05-19 RX ORDER — PANTOPRAZOLE SODIUM 40 MG/1
TABLET, DELAYED RELEASE ORAL
Qty: 90 TABLET | Refills: 1 | Status: SHIPPED | OUTPATIENT
Start: 2022-05-19 | End: 2022-11-15

## 2022-06-15 DIAGNOSIS — F64.0 GENDER DYSPHORIA IN ADULT: ICD-10-CM

## 2022-06-15 DIAGNOSIS — E34.9 HORMONE IMBALANCE: Primary | ICD-10-CM

## 2022-06-15 RX ORDER — TESTOSTERONE ENANTHATE 75 MG/.5ML
75 INJECTION SUBCUTANEOUS
Qty: 9 PEN | Refills: 1 | Status: SHIPPED | OUTPATIENT
Start: 2022-06-15 | End: 2022-11-17 | Stop reason: SDUPTHER

## 2022-06-29 DIAGNOSIS — N32.81 DETRUSOR MUSCLE HYPERTONIA: Primary | ICD-10-CM

## 2022-07-05 RX ORDER — BUPROPION HYDROCHLORIDE 150 MG/1
TABLET ORAL
Qty: 90 TABLET | Refills: 3 | Status: SHIPPED | OUTPATIENT
Start: 2022-07-05 | End: 2022-07-05

## 2022-07-05 RX ORDER — BUPROPION HYDROCHLORIDE 300 MG/1
300 TABLET ORAL DAILY
Qty: 90 TABLET | Refills: 1 | Status: SHIPPED | OUTPATIENT
Start: 2022-07-05 | End: 2022-07-14

## 2022-07-14 DIAGNOSIS — F41.1 GENERALIZED ANXIETY DISORDER: Primary | ICD-10-CM

## 2022-07-14 RX ORDER — BUPROPION HYDROCHLORIDE 150 MG/1
150 TABLET ORAL DAILY
Qty: 90 TABLET | Refills: 3 | Status: SHIPPED | OUTPATIENT
Start: 2022-07-14

## 2022-07-18 DIAGNOSIS — F90.9 ADULT ADHD: Primary | ICD-10-CM

## 2022-07-18 RX ORDER — METHYLPHENIDATE HYDROCHLORIDE EXTENDED RELEASE 10 MG/1
10 TABLET ORAL 2 TIMES DAILY
Qty: 60 TABLET | Refills: 0 | Status: SHIPPED | OUTPATIENT
Start: 2022-07-18 | End: 2022-08-26 | Stop reason: SDUPTHER

## 2022-08-26 DIAGNOSIS — F90.9 ADULT ADHD: ICD-10-CM

## 2022-08-26 RX ORDER — METHYLPHENIDATE HYDROCHLORIDE EXTENDED RELEASE 10 MG/1
10 TABLET ORAL 2 TIMES DAILY
Qty: 60 TABLET | Refills: 0 | Status: SHIPPED | OUTPATIENT
Start: 2022-08-26 | End: 2022-10-30 | Stop reason: SDUPTHER

## 2022-08-26 NOTE — TELEPHONE ENCOUNTER
Caller: Alexander Khalil    Relationship: Self    Best call back number: *    Requested Prescriptions:   Requested Prescriptions     Pending Prescriptions Disp Refills   • methylphenidate ER (METADATE ER) 10 MG CR tablet 60 tablet 0     Sig: Take 1 tablet by mouth 2 (Two) Times a Day.        [id number] [address] [phone] [facility] [phone] FX     Additional details provided by patient:     Does the patient have less than a 3 day supply:  [x] Yes  [] No    Kelly Killian   08/26/22 14:05 EDT

## 2022-09-01 ENCOUNTER — OFFICE VISIT (OUTPATIENT)
Dept: FAMILY MEDICINE CLINIC | Facility: CLINIC | Age: 42
End: 2022-09-01

## 2022-09-01 VITALS
HEIGHT: 64 IN | WEIGHT: 127 LBS | RESPIRATION RATE: 16 BRPM | BODY MASS INDEX: 21.68 KG/M2 | OXYGEN SATURATION: 98 % | SYSTOLIC BLOOD PRESSURE: 138 MMHG | DIASTOLIC BLOOD PRESSURE: 78 MMHG | HEART RATE: 78 BPM

## 2022-09-01 DIAGNOSIS — R11.2 NAUSEA AND VOMITING, UNSPECIFIED VOMITING TYPE: Primary | ICD-10-CM

## 2022-09-01 DIAGNOSIS — F64.0 GENDER DYSPHORIA IN ADULT: ICD-10-CM

## 2022-09-01 DIAGNOSIS — E34.9 HORMONE IMBALANCE: ICD-10-CM

## 2022-09-01 PROCEDURE — 99214 OFFICE O/P EST MOD 30 MIN: CPT | Performed by: FAMILY MEDICINE

## 2022-09-01 RX ORDER — ONDANSETRON 4 MG/1
4 TABLET, ORALLY DISINTEGRATING ORAL EVERY 8 HOURS PRN
Qty: 12 TABLET | Refills: 1 | Status: SHIPPED | OUTPATIENT
Start: 2022-09-01

## 2022-09-01 NOTE — PROGRESS NOTES
"Chief Complaint  Hypertension and Nausea (Food poison)    Subjective    History of Present Illness {CC  Problem List  Visit  Diagnosis   Encounters  Notes  Medications  Labs  Result Review Imaging  Media :23}     Alexander Khalil presents to [facility] for Hypertension and Nausea (Food poison).  History of Present Illness     Here today for follow-up as above. Has been experiencing some nausea and vomiting since the weekend, slowly improving. Was originally thinking it was food poisoning however his daughter says both had symptoms since. Now thinking it might be a GI virus. Wondering if he might get something to help with nausea as it continues to hit him intermittently. Has not had any vomiting in several days. Had some initial diarrhea that has since resolved.    Worried about slowly increasing blood pressure. Had relatively low blood pressure for years, feels that its been slowly creeping up for some time now. Apparently mother had a similar course and ended up being on medications later in life. He is hoping to avoid this for himself. Is not as physically active as he was previously. Diet is more or less unchanged. Stress is unchanged, feels that he is managing it fairly well. Does not check his pressures at home.    Would like to try to get back on Aveed if possible. Tolerating the testosterone enanthate fairly well though has noticed some chronic bumps in his abdomen following injections. Always tolerated Aveed extremely well without any adverse effects. Additionally has had some emotional difficulties towards the end of his dosing schedule, typically feeling like he is \"crawling out of my skin\" the day prior to injection. Had none of these problems while on Aveed.    Objective     Vital Signs:   /78   Pulse 78   Resp 16   Ht 162.6 cm (64\")   Wt 57.6 kg (127 lb)   SpO2 98%   BMI 21.80 kg/m²   Physical Exam  Vitals and nursing note reviewed.   Constitutional:       " General: He is not in acute distress.     Appearance: Normal appearance. He is not ill-appearing.   Cardiovascular:      Rate and Rhythm: Normal rate and regular rhythm.      Pulses: Normal pulses.      Heart sounds: Normal heart sounds. No murmur heard.  Pulmonary:      Effort: Pulmonary effort is normal. No respiratory distress.      Breath sounds: Normal breath sounds. No rales.   Neurological:      Mental Status: He is alert and oriented to person, place, and time. Mental status is at baseline.   Psychiatric:         Mood and Affect: Mood normal.         Behavior: Behavior normal.          Result Review  Data Reviewed:{ Labs  Result Review  Imaging  Med Tab  Media :23}                   Assessment and Plan {CC Problem List  Visit Diagnosis  ROS  Review (Popup)  Health Maintenance  Quality  BestPractice  Medications  SmartSets  SnapShot Encounters  Media :23}   Diagnoses and all orders for this visit:    1. Nausea and vomiting, unspecified vomiting type (Primary)  -     ondansetron ODT (ZOFRAN-ODT) 4 MG disintegrating tablet; Place 1 tablet on the tongue Every 8 (Eight) Hours As Needed for Nausea or Vomiting.  Dispense: 12 tablet; Refill: 1    2. Hormone imbalance  -     Discontinue: Testosterone Undecanoate 750 MG/3ML solution; Inject 750 mg into the appropriate muscle as directed by prescriber Every 3 (Three) Months.  Dispense: 3 mL; Refill: 3  -     Testosterone Undecanoate 750 MG/3ML solution; Inject 750 mg into the appropriate muscle as directed by prescriber Every 3 (Three) Months.  Dispense: 3 mL; Refill: 3    3. Gender dysphoria in adult  -     Discontinue: Testosterone Undecanoate 750 MG/3ML solution; Inject 750 mg into the appropriate muscle as directed by prescriber Every 3 (Three) Months.  Dispense: 3 mL; Refill: 3  -     Testosterone Undecanoate 750 MG/3ML solution; Inject 750 mg into the appropriate muscle as directed by prescriber Every 3 (Three) Months.  Dispense: 3 mL; Refill:  3    Orders as above. We will try to get Aveed covered by his insurance. Anticipate need for prior authorization and appeal.    Recommend follow-up as below. Encouraged communication via [employer] in the meantime.    Patient was given instructions and counseling regarding his condition or for health maintenance advice. Please see specific information pulled into the AVS (placed there by myself) if appropriate.    Return in about 3 months (around 12/1/2022), or if symptoms worsen or fail to improve, for f/u gender-affirming hormone therapy.      ARELIS Vincent MD

## 2022-09-23 ENCOUNTER — TELEPHONE (OUTPATIENT)
Dept: FAMILY MEDICINE CLINIC | Facility: CLINIC | Age: 42
End: 2022-09-23

## 2022-09-23 NOTE — TELEPHONE ENCOUNTER
Caller: [facility] - [place], [place] - [address] [facility] [phone] [facility] [phone] FX    Relationship: Pharmacy    Best call back number: [phone]    What is the best time to reach you: SOON AS POSSIBLE     What was the call regarding: THE PROVIDER ISN'T LISTED AS ELIGIBLE TO PRESCRIBE THAT MEDICATION, NEEDS A CALL BACK SOON AS POSSIBLE     Do you require a callback: YES

## 2022-10-06 ENCOUNTER — TELEPHONE (OUTPATIENT)
Dept: FAMILY MEDICINE CLINIC | Facility: CLINIC | Age: 42
End: 2022-10-06

## 2022-10-06 NOTE — TELEPHONE ENCOUNTER
[facility]     Pharmacy representative name: SWEETIE    Pharmacy representative phone number: [phone]    What medication are you calling in regards to: Testosterone Undecanoate 750 MG/3ML solution    What question does the pharmacy have: WOULD LIKE TO RECEIVE A CALL WHEN THE RECERTIFICATION HAS BEEN APPROVED    Who is the provider that prescribed the medication: TI

## 2022-10-27 ENCOUNTER — TELEPHONE (OUTPATIENT)
Dept: FAMILY MEDICINE CLINIC | Facility: CLINIC | Age: 42
End: 2022-10-27

## 2022-10-27 NOTE — TELEPHONE ENCOUNTER
Caller: Alexander Khalil    Relationship: Self    Best call back number: [phone]    What is the best time to reach you: ANYTIME  Who are you requesting to speak with (clinical staff, provider,  specific staff member): CLINICAL STAFF    Do you know the name of the person who called: SELF       What was the call regarding:PATIENT IS CALLING CHECKING THE STATUS OF THIS MEDICATION XYOSTED. PATIENT STATES HE HAS NOT HEARD ANYTHING SINCE LAST MONTH AND WOULD LIKE FOR YOU TO GIVE HIM A CALL. PLEASE CONTACT [facility] FOR DETAILS.   Do you require a callback: YES

## 2022-10-30 DIAGNOSIS — F90.9 ADULT ADHD: ICD-10-CM

## 2022-10-31 RX ORDER — METHYLPHENIDATE HYDROCHLORIDE EXTENDED RELEASE 10 MG/1
10 TABLET ORAL 2 TIMES DAILY
Qty: 60 TABLET | Refills: 0 | Status: SHIPPED | OUTPATIENT
Start: 2022-10-31 | End: 2023-01-02 | Stop reason: SDUPTHER

## 2022-11-15 RX ORDER — PANTOPRAZOLE SODIUM 40 MG/1
TABLET, DELAYED RELEASE ORAL
Qty: 90 TABLET | Refills: 3 | Status: SHIPPED | OUTPATIENT
Start: 2022-11-15 | End: 2022-12-13 | Stop reason: ALTCHOICE

## 2022-11-17 DIAGNOSIS — F64.0 GENDER DYSPHORIA IN ADULT: ICD-10-CM

## 2022-11-17 DIAGNOSIS — E34.9 HORMONE IMBALANCE: ICD-10-CM

## 2022-11-17 RX ORDER — TESTOSTERONE ENANTHATE 75 MG/.5ML
75 INJECTION SUBCUTANEOUS
Qty: 1.5 ML | Refills: 0 | Status: SHIPPED | OUTPATIENT
Start: 2022-11-17 | End: 2022-12-29

## 2022-11-28 ENCOUNTER — TELEPHONE (OUTPATIENT)
Dept: FAMILY MEDICINE CLINIC | Facility: CLINIC | Age: 42
End: 2022-11-28

## 2022-11-28 NOTE — TELEPHONE ENCOUNTER
Caller: [facility] - [place] - [phone] [facility] [phone] FX    Relationship: Pharmacy    Best call back number: [phone]    What was the call regarding: PLEASE ADVISE IF PATIENT'S MEDICATION WAS RECEIVED FROM DELIVERY AT THE OFFICE    Testosterone Undecanoate 750 MG/3ML solution    Do you require a callback: YES, PLEASE CALL TO CONFIRM DELIVERY OR TO ADVISE IF IT HAS NOT BEEN RECEIVED.

## 2022-11-30 ENCOUNTER — TELEPHONE (OUTPATIENT)
Dept: FAMILY MEDICINE CLINIC | Facility: CLINIC | Age: 42
End: 2022-11-30

## 2022-11-30 DIAGNOSIS — K21.9 CHRONIC GERD: Primary | ICD-10-CM

## 2022-11-30 NOTE — TELEPHONE ENCOUNTER
Does patient need to see Dr. GOODMAN or just a nurse visit for testosterone injection?  Have we received his prescription?

## 2022-11-30 NOTE — TELEPHONE ENCOUNTER
The rescheduled delivery is for Thursday 12/1. I don't have an ETA of when it will be delivered that day. But the pt will need to make a nurse visit appt as we have to do this in the office. Thanks

## 2022-12-13 ENCOUNTER — OFFICE VISIT (OUTPATIENT)
Dept: GASTROENTEROLOGY | Facility: CLINIC | Age: 42
End: 2022-12-13

## 2022-12-13 ENCOUNTER — TELEPHONE (OUTPATIENT)
Dept: FAMILY MEDICINE CLINIC | Facility: CLINIC | Age: 42
End: 2022-12-13

## 2022-12-13 VITALS
HEIGHT: 64 IN | OXYGEN SATURATION: 98 % | DIASTOLIC BLOOD PRESSURE: 83 MMHG | HEART RATE: 82 BPM | TEMPERATURE: 96.7 F | WEIGHT: 132.6 LBS | SYSTOLIC BLOOD PRESSURE: 123 MMHG | BODY MASS INDEX: 22.64 KG/M2

## 2022-12-13 DIAGNOSIS — K21.9 GASTROESOPHAGEAL REFLUX DISEASE WITHOUT ESOPHAGITIS: Primary | ICD-10-CM

## 2022-12-13 PROCEDURE — 99203 OFFICE O/P NEW LOW 30 MIN: CPT | Performed by: INTERNAL MEDICINE

## 2022-12-13 RX ORDER — RABEPRAZOLE SODIUM 20 MG/1
20 TABLET, DELAYED RELEASE ORAL DAILY
Qty: 90 TABLET | Refills: 3 | Status: SHIPPED | OUTPATIENT
Start: 2022-12-13

## 2022-12-13 NOTE — PROGRESS NOTES
Chief Complaint   Patient presents with   • Heartburn     Alexander Khalil is a 42 y.o. male who presents with a history of reflux  HPI     Patient 42-year-old male, with history of GERD, IBS, neurogenic bladder, post transgender surgery complaining of reflux.  Patient denies classic heartburn symptoms but does report ENT felt there were changes on the cords related to reflux.  Patient reports occasional burning in his mouth and gums noted.  Patient also suggests the sensation of regurgitation though no heartburn is felt.  Patient reports a normal EGD in 2016.  Patient denies weight loss no nausea vomiting no melena or bright red blood per rectum.  Patient denies dysphagia.    Past Medical History:   Diagnosis Date   • ADHD (attention deficit hyperactivity disorder)    • Anxiety    • Compulsive disorder    • Depression    • Gender dysphoria    • GERD (gastroesophageal reflux disease)    • Headache 2010   • History of medical problems 2001    OCD   • Irritable bowel syndrome 2007   • Neurogenic bladder        Current Outpatient Medications:   •  buPROPion XL (WELLBUTRIN XL) 150 MG 24 hr tablet, Take 1 tablet by mouth Daily., Disp: 90 tablet, Rfl: 3  •  cetirizine (zyrTEC) 10 MG tablet, , Disp: , Rfl:   •  finasteride (PROSCAR) 5 MG tablet, TAKE 1 TABLET BY MOUTH EVERY DAY, Disp: 90 tablet, Rfl: 3  •  fluticasone (FLONASE) 50 MCG/ACT nasal spray, 2 sprays into the nostril(s) as directed by provider Daily., Disp: , Rfl:   •  ketoconazole (NIZORAL) 2 % shampoo, APPLY TO FACE DAILY, LET SIT FOR 5 MINUTES THEN RINSE, Disp: , Rfl:   •  methylphenidate ER (METADATE ER) 10 MG CR tablet, Take 1 tablet by mouth 2 (Two) Times a Day., Disp: 60 tablet, Rfl: 0  •  Mirabegron ER (Myrbetriq) 50 MG tablet sustained-release 24 hour 24 hr tablet, Take 50 mg by mouth Daily., Disp: 90 tablet, Rfl: 3  •  olopatadine (PATADAY) 0.2 % solution ophthalmic solution, ADMINISTER 1 DROP TO BOTH EYES DAILY., Disp: 2.5 mL, Rfl: 1  •  ondansetron ODT  (ZOFRAN-ODT) 4 MG disintegrating tablet, Place 1 tablet on the tongue Every 8 (Eight) Hours As Needed for Nausea or Vomiting., Disp: 12 tablet, Rfl: 1  •  Testosterone Undecanoate 750 MG/3ML solution, Inject 750 mg into the appropriate muscle as directed by prescriber Every 3 (Three) Months., Disp: 3 mL, Rfl: 3  •  Melatonin 10 MG capsule, Take 5 mg by mouth., Disp: , Rfl:   •  RABEprazole (ACIPHEX) 20 MG EC tablet, Take 1 tablet by mouth Daily., Disp: 90 tablet, Rfl: 3  •  Testosterone Enanthate (Xyosted) 75 MG/0.5ML solution auto-injector, Inject 75 mg under the skin into the appropriate area as directed Every 10 (Ten) Days., Disp: 1.5 mL, Rfl: 0  Allergies   Allergen Reactions   • Fish-Derived Products Anaphylaxis     Eel   • Sulfa Antibiotics Unknown - Low Severity     difficulty breathing, made me funny     • Latex Rash     Skin irritation        Social History     Socioeconomic History   • Marital status: Unknown   Tobacco Use   • Smoking status: Never   • Smokeless tobacco: Never   Substance and Sexual Activity   • Alcohol use: Not Currently   • Drug use: Never   • Sexual activity: Defer     Partners: Female, Male     Birth control/protection: Condom     Family History   Problem Relation Age of Onset   • Heart disease Mother    • Atrial fibrillation Mother    • Mitral valve prolapse Mother    • Heart failure Mother    • Uterine cancer Mother    • Anxiety disorder Mother    • Cancer Mother    • Hyperlipidemia Mother    • Miscarriages / Stillbirths Mother         Lost several pregnancies   • Heart attack Father    • Skin cancer Father    • COPD Father    • Heart disease Father    • Hyperlipidemia Father    • Other Father         OCD   • Skin cancer Brother    • Other Brother         OCD   • Skin cancer Paternal Grandmother    • Heart failure Paternal Grandmother    • Arthritis Paternal Grandmother    • Depression Paternal Grandmother    • Hearing loss Paternal Grandmother    • Heart disease Paternal Grandmother     • Other Paternal Grandmother         OCD   • Alcohol abuse Maternal Uncle    • Liver disease Maternal Uncle    • Alcohol abuse Maternal Uncle    • Liver disease Maternal Uncle    • Anxiety disorder Daughter    • Learning disabilities Daughter    • Anxiety disorder Sister    • Anxiety disorder Maternal Grandmother    • Developmental Disability Daughter    • Learning disabilities Daughter    • Vision loss Paternal Grandfather      Review of Systems   Constitutional: Negative.    HENT: Negative.    Eyes: Negative.    Respiratory: Negative.    Cardiovascular: Negative.    Gastrointestinal: Negative.    Endocrine: Negative.    Musculoskeletal: Negative.    Skin: Negative.    Allergic/Immunologic: Negative.    Hematological: Negative.      Vitals:    12/13/22 1536   BP: 123/83   Pulse: 82   Temp: 96.7 °F (35.9 °C)   SpO2: 98%     Physical Exam  Vitals reviewed.   Constitutional:       Appearance: Normal appearance. He is well-developed and normal weight.   HENT:      Head: Normocephalic and atraumatic.   Eyes:      General: No scleral icterus.     Conjunctiva/sclera: Conjunctivae normal.   Neck:      Trachea: No tracheal deviation.   Cardiovascular:      Rate and Rhythm: Normal rate and regular rhythm.   Pulmonary:      Effort: Pulmonary effort is normal. No respiratory distress.      Breath sounds: Normal breath sounds.   Abdominal:      General: Bowel sounds are normal. There is no distension.      Palpations: Abdomen is soft. There is no mass.      Tenderness: There is no abdominal tenderness.   Musculoskeletal:         General: No swelling or tenderness. Normal range of motion.      Cervical back: Normal range of motion and neck supple. No rigidity.   Skin:     General: Skin is warm and dry.      Coloration: Skin is not jaundiced.      Findings: No rash.   Neurological:      General: No focal deficit present.      Mental Status: He is alert and oriented to person, place, and time.      Cranial Nerves: No cranial  nerve deficit.   Psychiatric:         Behavior: Behavior normal.         Thought Content: Thought content normal.         Judgment: Judgment normal.       Diagnoses and all orders for this visit:    Gastroesophageal reflux disease without esophagitis    Other orders  -     RABEprazole (ACIPHEX) 20 MG EC tablet; Take 1 tablet by mouth Daily.    Patient 42-year-old male, transgender, with history of neurogenic bladder, GERD, ADHD with a history of irritable bowel complaining of reflux.  On discussion patient no longer has classical heartburn but reports regurgitation with burning in his mouth and reported signs by ENT of reflux on his vocal cords.  Patient reports EGD in 2016 was normal, no Chowdary's found.  Patient reports some increase in symptoms of regurgitation over the last year without typical acid heartburn.  Patient was ill earlier this year but no nausea and vomiting or diarrhea at this time.  Explained that the medication such as pantoprazole, use to control acidity and that does not necessarily stop her reflux.  For now we will try a different PPI to see if it affects his symptoms any better.  If not, the issue may be ongoing regurgitation related to hiatal hernia and patient may need antireflux surgery if this is going to improve.  We will follow-up symptomatically on the new PPI but if no improvement consider EGD with manometry to document the effectiveness of his GE junction for consideration for antireflux surgery.  Patient has tried omeprazole esomeprazole now pantoprazole and lansoprazole prior to this point.

## 2022-12-13 NOTE — TELEPHONE ENCOUNTER
Patients testosterone was scheduled for delivery 12/1/2022. No delivery was made, patient arrived for injection today. While in office I reached out to specialty pharmacy to verify. [facility] stated that the order was cancelled d/t error on their part. I rescheduled delivery for 12/15/2022.     Patient was made aware of situation and I informed him that I would call him when delivery was made to reschedule for injection.

## 2022-12-27 ENCOUNTER — CLINICAL SUPPORT (OUTPATIENT)
Dept: FAMILY MEDICINE CLINIC | Facility: CLINIC | Age: 42
End: 2022-12-27
Payer: COMMERCIAL

## 2022-12-27 DIAGNOSIS — E34.9 HORMONE IMBALANCE: Primary | ICD-10-CM

## 2022-12-29 DIAGNOSIS — E34.9 HORMONE IMBALANCE: ICD-10-CM

## 2022-12-29 DIAGNOSIS — F64.0 GENDER DYSPHORIA IN ADULT: ICD-10-CM

## 2023-01-02 DIAGNOSIS — F90.9 ADULT ADHD: ICD-10-CM

## 2023-01-03 RX ORDER — METHYLPHENIDATE HYDROCHLORIDE EXTENDED RELEASE 10 MG/1
10 TABLET ORAL 2 TIMES DAILY
Qty: 60 TABLET | Refills: 0 | Status: SHIPPED | OUTPATIENT
Start: 2023-01-03 | End: 2023-03-06 | Stop reason: SDUPTHER

## 2023-02-01 ENCOUNTER — TELEMEDICINE (OUTPATIENT)
Dept: SLEEP MEDICINE | Facility: HOSPITAL | Age: 43
End: 2023-02-01
Payer: COMMERCIAL

## 2023-02-01 DIAGNOSIS — G47.33 OBSTRUCTIVE SLEEP APNEA: Primary | ICD-10-CM

## 2023-02-01 PROCEDURE — 99213 OFFICE O/P EST LOW 20 MIN: CPT | Performed by: FAMILY MEDICINE

## 2023-02-01 NOTE — PROGRESS NOTES
Follow Up [facility] Disorders Center Note     Chief Complaint:  TWYLA     This visit was completed via video.  All issues as below were discussed and addressed but no physical exam was performed.  If it was felt that the patient should be evaluated in clinic then they were directed there.  The patient verbally consented to visit.    Primary Care Physician: Yossi Vincent MD    Interval History:   The patient is a 42 y.o. male  who was last seen in the sleep lab: 2/16/2022.  Presents today for annual follow-up of TWYLA.  HST March 2021 showed AHI 6.2 supine AHI 9.3 on auto CPAP 6-15 then changed to set pressure of 9 cm H2O.  At last visit had decreased sleeping hours however was working on improving.  Today reports mask fit issues; harness is digging into the top of his head and tube is pulling his neck may need different harness and/or longer tube.  Also reporting dry mouth has not adjusted humidity settings.    Downloaded PAP Data Reviewed For Compliance:  DME is [facility].  Downloads between 1/1/2023 - 1/30/2023.  Average usage is 5 hours 21 minutes.  Average AHI is two-point.  Average auto CPAP pressure is to 9 cm H2O    I have reviewed the above results and compared them with the patient's last downloads and reviewed with the patient.    Review of Systems:    A complete review of systems was done and all were negative with the exception of all negative    Social History:    Social History     Socioeconomic History   • Marital status: Unknown   Tobacco Use   • Smoking status: Never   • Smokeless tobacco: Never   Substance and Sexual Activity   • Alcohol use: Not Currently   • Drug use: Never   • Sexual activity: Defer     Partners: Female, Male     Birth control/protection: Condom       Allergies:  Fish-derived products, Sulfa antibiotics, and Latex     Medication Review:  Reviewed.      Impression:   1. Obstructive sleep apnea        Obstructive sleep apnea adequately treated with CPAP 9 cm H2O.    Plan:  Good  sleep hygiene measures should be maintained.    After evaluating the patient and assessing results available, the patient is benefiting from the treatment being provided.     The patient will continue CPAP 9 cm H2O.  After clinical evaluation and review of downloads, I recommend no changes to the patient's pressures.  A new prescription will be sent to the patient's DME.    Patient will also contact DME about mask fitting to consider different harness or longer tube; also advised adjusting humidity settings to help with dry mouth.  We discussed considering oral mandibular device however patient feels he is fine is uncomfortable because in the past a mouthguard caused jaw issues.    Caution during activities that require prolonged concentration is strongly advised if sleepiness returns. Changing of PAP supplies regularly is important for effective use. Patient needs to change cushion on the mask or plugs on nasal pillows along with disposable filters once every month and change mask frame, tubing, headgear and Velcro straps every 6 months at the minimum.    I answered all of the patient's questions.  The patient will call for any problems and will follow up in 1 year.      Suzan Roberts MD  Sleep Medicine  02/01/23  09:52 EST

## 2023-03-06 DIAGNOSIS — F90.9 ADULT ADHD: ICD-10-CM

## 2023-03-06 RX ORDER — METHYLPHENIDATE HYDROCHLORIDE EXTENDED RELEASE 10 MG/1
10 TABLET ORAL 2 TIMES DAILY
Qty: 60 TABLET | Refills: 0 | Status: SHIPPED | OUTPATIENT
Start: 2023-03-06

## 2023-03-08 DIAGNOSIS — N32.81 DETRUSOR MUSCLE HYPERTONIA: ICD-10-CM

## 2023-03-14 ENCOUNTER — OFFICE VISIT (OUTPATIENT)
Dept: FAMILY MEDICINE CLINIC | Facility: CLINIC | Age: 43
End: 2023-03-14
Payer: COMMERCIAL

## 2023-03-14 VITALS
WEIGHT: 133 LBS | DIASTOLIC BLOOD PRESSURE: 78 MMHG | HEART RATE: 80 BPM | OXYGEN SATURATION: 98 % | BODY MASS INDEX: 22.83 KG/M2 | SYSTOLIC BLOOD PRESSURE: 128 MMHG | RESPIRATION RATE: 18 BRPM

## 2023-03-14 DIAGNOSIS — M54.16 LUMBAR RADICULOPATHY: Primary | ICD-10-CM

## 2023-03-14 DIAGNOSIS — M54.12 CERVICAL RADICULOPATHY: ICD-10-CM

## 2023-03-14 PROCEDURE — 99213 OFFICE O/P EST LOW 20 MIN: CPT | Performed by: FAMILY MEDICINE

## 2023-03-14 NOTE — PROGRESS NOTES
Chief Complaint  Numbness (L foot x6 months )    Subjective    History of Present Illness {CC  Problem List  Visit  Diagnosis   Encounters  Notes  Medications  Labs  Result Review Imaging  Media :23}     Alexander Khalil presents to [facility] for Numbness (L foot x6 months ).  History of Present Illness     Here today with complaints as above. Has had some intermittent numbness on the medial side of his left great toe for a while now. Seems to be somewhat positional as it is brought on typically by walking. Wears a variety of different shoes so has not found any 1 shoe to provoke it. Does have fairly chronic low back tightness, thinks this may potentially contribute. Has had some longstanding low back pain as well, no previous imaging. Interestingly has very similar symptoms in his hands at times. Has numbness and tingling and chronic neck pain. Wondering if all of the symptoms might somehow be connected.    Had previously been undergoing fairly extensive physical therapy with some improvement in the overall muscle tension and radicular symptoms.     Objective     Vital Signs:   /78   Pulse 80   Resp 18   Wt 60.3 kg (133 lb)   SpO2 98%   BMI 22.83 kg/m²   Physical Exam  Vitals and nursing note reviewed.   Constitutional:       General: He is not in acute distress.     Appearance: Normal appearance. He is not ill-appearing.   Musculoskeletal:      Cervical back: Spasms and tenderness present. Decreased range of motion.      Lumbar back: Spasms and tenderness present. Negative right straight leg raise test and negative left straight leg raise test.   Neurological:      Mental Status: He is alert.          Result Review  Data Reviewed:{ Labs  Result Review  Imaging  Med Tab  Media :23}                   Assessment and Plan {CC Problem List  Visit Diagnosis  ROS  Review (Popup)  Health Maintenance  Quality  BestPractice  Medications  SmartSets  SnapShot  Encounters  Media :23}   Diagnoses and all orders for this visit:    1. Lumbar radiculopathy (Primary)  -     MRI Lumbar Spine Without Contrast; Future    2. Cervical radiculopathy  -     MRI Cervical Spine Without Contrast; Future    Imaging as above. Discussed the fact that it is unlikely that there will be any sort of definitive diagnosis from this imaging however it will be helpful to rule things out. I will contact him with results as available and will discuss further work-up at that time.    Patient was given instructions and counseling regarding his condition or for health maintenance advice. Please see specific information pulled into the AVS (placed there by myself) if appropriate.    Return if symptoms worsen or fail to improve.      ARELIS Vincent MD

## 2023-03-23 DIAGNOSIS — E34.9 HORMONE IMBALANCE: ICD-10-CM

## 2023-03-23 DIAGNOSIS — F64.0 GENDER DYSPHORIA IN ADULT: ICD-10-CM

## 2023-03-28 ENCOUNTER — CLINICAL SUPPORT (OUTPATIENT)
Dept: FAMILY MEDICINE CLINIC | Facility: CLINIC | Age: 43
End: 2023-03-28
Payer: COMMERCIAL

## 2023-03-28 DIAGNOSIS — E34.9 HORMONE IMBALANCE: Primary | ICD-10-CM

## 2023-03-28 PROCEDURE — 99211 OFF/OP EST MAY X REQ PHY/QHP: CPT | Performed by: FAMILY MEDICINE

## 2023-03-30 DIAGNOSIS — E34.9 HORMONE IMBALANCE: ICD-10-CM

## 2023-03-30 DIAGNOSIS — F64.0 GENDER DYSPHORIA IN ADULT: ICD-10-CM

## 2023-04-06 ENCOUNTER — TELEPHONE (OUTPATIENT)
Dept: FAMILY MEDICINE CLINIC | Facility: CLINIC | Age: 43
End: 2023-04-06

## 2023-04-06 NOTE — TELEPHONE ENCOUNTER
Caller: Alexander Khalil    Relationship: Self    Best call back number: [phone]    Caller requesting test results: SELF    What test was performed: TWO MRI'S,ONE OF  C SPINE AND ONE OF L SPINE    When was the test performed: 4-5-23    Where was the test performed: [facility]    Additional notes: PLEASE CALL TO ADVISE WHEN RESULTS OBTAINED.

## 2023-04-27 RX ORDER — FINASTERIDE 1 MG/1
1 TABLET, FILM COATED ORAL DAILY
Qty: 90 TABLET | Refills: 3 | Status: SHIPPED | OUTPATIENT
Start: 2023-04-27

## 2023-05-03 DIAGNOSIS — F41.1 GENERALIZED ANXIETY DISORDER: ICD-10-CM

## 2023-05-03 DIAGNOSIS — F90.9 ADULT ADHD: ICD-10-CM

## 2023-05-03 RX ORDER — METHYLPHENIDATE HYDROCHLORIDE EXTENDED RELEASE 10 MG/1
10 TABLET ORAL 2 TIMES DAILY
Qty: 60 TABLET | Refills: 0 | Status: SHIPPED | OUTPATIENT
Start: 2023-05-03

## 2023-05-03 RX ORDER — FINASTERIDE 1 MG/1
1 TABLET, FILM COATED ORAL DAILY
Qty: 30 TABLET | Refills: 0 | Status: SHIPPED | OUTPATIENT
Start: 2023-05-03

## 2023-05-03 RX ORDER — BUPROPION HYDROCHLORIDE 150 MG/1
150 TABLET ORAL DAILY
Qty: 90 TABLET | Refills: 3 | Status: SHIPPED | OUTPATIENT
Start: 2023-05-03

## 2023-05-16 ENCOUNTER — OFFICE VISIT (OUTPATIENT)
Dept: FAMILY MEDICINE CLINIC | Facility: CLINIC | Age: 43
End: 2023-05-16
Payer: COMMERCIAL

## 2023-05-16 VITALS
DIASTOLIC BLOOD PRESSURE: 74 MMHG | SYSTOLIC BLOOD PRESSURE: 124 MMHG | RESPIRATION RATE: 18 BRPM | BODY MASS INDEX: 23.34 KG/M2 | WEIGHT: 136 LBS | OXYGEN SATURATION: 95 % | HEART RATE: 76 BPM

## 2023-05-16 DIAGNOSIS — M25.561 CHRONIC PAIN OF RIGHT KNEE: Primary | ICD-10-CM

## 2023-05-16 DIAGNOSIS — G89.29 CHRONIC PAIN OF RIGHT KNEE: Primary | ICD-10-CM

## 2023-05-16 PROCEDURE — 99213 OFFICE O/P EST LOW 20 MIN: CPT | Performed by: FAMILY MEDICINE

## 2023-05-16 NOTE — PROGRESS NOTES
Chief Complaint  Knee Pain (R knee)    Subjective    History of Present Illness {CC  Problem List  Visit  Diagnosis   Encounters  Notes  Medications  Labs  Result Review Imaging  Media :23}     Alexander Khalil presents to [facility] for Knee Pain (R knee).  History of Present Illness     Today with complaint of some right knee pain. Admits that it has improved since we initially discussed this. Initially started after participation in kickball. Was posterior and posterior lateral right knee pain. Was rather painful after playing a game. Improved with rest. Had not taken any ibuprofen nor tried ice.    Objective     Vital Signs:   /74   Pulse 76   Resp 18   Wt 61.7 kg (136 lb)   SpO2 95%   BMI 23.34 kg/m²   Physical Exam  Vitals and nursing note reviewed.   Constitutional:       General: He is not in acute distress.     Appearance: Normal appearance. He is not ill-appearing.   Musculoskeletal:      Right knee: Normal range of motion. Tenderness present over the LCL. No medial joint line or lateral joint line tenderness.      Left knee: Normal.      Comments: May be a little bit of tenderness posteriorly and over the right MCL.   Neurological:      Mental Status: He is alert.          Result Review  Data Reviewed:{ Labs  Result Review  Imaging  Med Tab  Media :23}                   Assessment and Plan {CC Problem List  Visit Diagnosis  ROS  Review (Popup)  Health Maintenance  Quality  BestPractice  Medications  SmartSets  SnapShot Encounters  Media :23}   Diagnoses and all orders for this visit:    1. Chronic pain of right knee (Primary)    Seems to be improving without intervention. Continue to monitor. Ibuprofen and ice as needed. He will keep me updated with his progress.    Patient was given instructions and counseling regarding his condition or for health maintenance advice. Please see specific information pulled into the AVS (placed there by myself)  if appropriate.    Return if symptoms worsen or fail to improve.      ARELIS Vincent MD

## 2023-06-05 ENCOUNTER — TELEPHONE (OUTPATIENT)
Dept: FAMILY MEDICINE CLINIC | Facility: CLINIC | Age: 43
End: 2023-06-05
Payer: COMMERCIAL

## 2023-06-07 ENCOUNTER — CLINICAL SUPPORT (OUTPATIENT)
Dept: FAMILY MEDICINE CLINIC | Facility: CLINIC | Age: 43
End: 2023-06-07
Payer: COMMERCIAL

## 2023-06-07 DIAGNOSIS — E34.9 HORMONE IMBALANCE: Primary | ICD-10-CM

## 2023-06-07 DIAGNOSIS — F64.0 GENDER DYSPHORIA IN ADULT: ICD-10-CM

## 2023-06-07 PROCEDURE — 99212 OFFICE O/P EST SF 10 MIN: CPT | Performed by: FAMILY MEDICINE

## 2023-08-17 DIAGNOSIS — F90.9 ADULT ADHD: ICD-10-CM

## 2023-08-17 RX ORDER — METHYLPHENIDATE HYDROCHLORIDE EXTENDED RELEASE 10 MG/1
10 TABLET ORAL 2 TIMES DAILY
Qty: 60 TABLET | Refills: 0 | Status: SHIPPED | OUTPATIENT
Start: 2023-08-17

## 2023-08-20 NOTE — TELEPHONE ENCOUNTER
[facility] IS CALLING REGARDING THE STATUS OF A PA.  FOR Testosterone Enanthate (Xyosted) 50 MG/0.5ML solution auto-injector    CB#: [phone]      
36.9

## 2023-09-07 ENCOUNTER — CLINICAL SUPPORT (OUTPATIENT)
Dept: FAMILY MEDICINE CLINIC | Facility: CLINIC | Age: 43
End: 2023-09-07
Payer: COMMERCIAL

## 2023-09-07 DIAGNOSIS — F64.0 GENDER DYSPHORIA IN ADULT: Primary | ICD-10-CM

## 2023-09-21 DIAGNOSIS — R30.0 DYSURIA: Primary | ICD-10-CM

## 2023-10-18 ENCOUNTER — TELEPHONE (OUTPATIENT)
Dept: FAMILY MEDICINE CLINIC | Facility: CLINIC | Age: 43
End: 2023-10-18

## 2023-10-18 DIAGNOSIS — E34.9 HORMONE IMBALANCE: ICD-10-CM

## 2023-10-18 DIAGNOSIS — F64.0 GENDER DYSPHORIA IN ADULT: ICD-10-CM

## 2023-10-18 NOTE — TELEPHONE ENCOUNTER
Pharmacy Name:  [facility]    Pharmacy representative name: VICTORINO    Pharmacy representative phone number: [phone]     What medication are you calling in regards to: AVEED 750 MG / 3ML     Who is the provider that prescribed the medication: DR. LUKE    Additional notes: THEY NEED A NEW RX IF PATIENT IS TO CONTINUE THIS MEDICATION. IT IS NO LONGER ON THE ACTIVE MED LIST.    [facility] - [phone] [facility] [phone]  [phone]

## 2023-10-27 DIAGNOSIS — F90.9 ADULT ADHD: ICD-10-CM

## 2023-10-27 DIAGNOSIS — R11.2 NAUSEA AND VOMITING, UNSPECIFIED VOMITING TYPE: ICD-10-CM

## 2023-10-27 RX ORDER — METHYLPHENIDATE HYDROCHLORIDE EXTENDED RELEASE 10 MG/1
10 TABLET ORAL 2 TIMES DAILY
Qty: 60 TABLET | Refills: 0 | Status: SHIPPED | OUTPATIENT
Start: 2023-10-27

## 2023-10-27 RX ORDER — OLOPATADINE HYDROCHLORIDE 2 MG/ML
1 SOLUTION/ DROPS OPHTHALMIC DAILY
Qty: 2.5 ML | Refills: 1 | Status: SHIPPED | OUTPATIENT
Start: 2023-10-27

## 2023-10-27 RX ORDER — ONDANSETRON 4 MG/1
4 TABLET, ORALLY DISINTEGRATING ORAL EVERY 8 HOURS PRN
Qty: 12 TABLET | Refills: 1 | Status: SHIPPED | OUTPATIENT
Start: 2023-10-27

## 2023-11-08 ENCOUNTER — OFFICE VISIT (OUTPATIENT)
Dept: FAMILY MEDICINE CLINIC | Facility: CLINIC | Age: 43
End: 2023-11-08
Payer: COMMERCIAL

## 2023-11-08 VITALS
DIASTOLIC BLOOD PRESSURE: 78 MMHG | RESPIRATION RATE: 17 BRPM | OXYGEN SATURATION: 98 % | HEIGHT: 64 IN | BODY MASS INDEX: 23.9 KG/M2 | HEART RATE: 97 BPM | SYSTOLIC BLOOD PRESSURE: 136 MMHG | WEIGHT: 140 LBS

## 2023-11-08 DIAGNOSIS — Z51.81 THERAPEUTIC DRUG MONITORING: ICD-10-CM

## 2023-11-08 DIAGNOSIS — Z13.1 SCREENING FOR DIABETES MELLITUS: ICD-10-CM

## 2023-11-08 DIAGNOSIS — Z13.6 ENCOUNTER FOR LIPID SCREENING FOR CARDIOVASCULAR DISEASE: ICD-10-CM

## 2023-11-08 DIAGNOSIS — E34.9 HORMONE IMBALANCE: ICD-10-CM

## 2023-11-08 DIAGNOSIS — Z13.220 ENCOUNTER FOR LIPID SCREENING FOR CARDIOVASCULAR DISEASE: ICD-10-CM

## 2023-11-08 DIAGNOSIS — J98.01 BRONCHOSPASM: ICD-10-CM

## 2023-11-08 DIAGNOSIS — Z00.00 ROUTINE HEALTH MAINTENANCE: Primary | ICD-10-CM

## 2023-11-08 DIAGNOSIS — F64.0 GENDER DYSPHORIA IN ADULT: ICD-10-CM

## 2023-11-08 NOTE — PROGRESS NOTES
"Chief Complaint  Annual Exam and Shortness of Breath    Subjective    History of Present Illness {CC  Problem List  Visit  Diagnosis   Encounters  Notes  Medications  Labs  Result Review Imaging  Media :23}     Alexander Khalil presents to [facility] for Annual Exam and Shortness of Breath.  History of Present Illness     Here today for annual exam and discuss preventive health maintenance.    Doing fairly well overall. Only concern is some occasional shortness of breath that seems to be brought on by noxious smells. Has a longstanding allergies, thinks this might be related. Takes cetirizine most days, occasional fluticasone. Has not tried increasing fluticasone recently.    Continues on testosterone undecanoate for management of gender dysphoria. Due for a check of hormone levels at some point in the near future. Would like to come back at a slightly more convenient time.    Up-to-date with vaccinations and other preventive maintenance recommendations.    Weight is up somewhat, not overly concerned. No major changes to diet or exercise. Continues to exercise regularly and eats a balanced diet for the most part.    Has good family and social support. 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    Objective     Vital Signs:   /78   Pulse 97   Resp 17   Ht 162.6 cm (64\")   Wt 63.5 kg (140 lb)   SpO2 98%   BMI 24.03 kg/m²   Physical Exam  Vitals and nursing note reviewed.   Constitutional:       General: He is not in acute distress.     Appearance: Normal appearance. He is not ill-appearing.   Cardiovascular:      Rate and Rhythm: Normal rate and regular rhythm.      Pulses: Normal pulses.      Heart sounds: Normal heart sounds. No murmur heard.  Pulmonary:      Effort: Pulmonary effort is normal. No respiratory distress.      Breath sounds: Normal breath sounds. No rales.   Neurological:      Mental Status: He is alert and oriented to person, place, and time. Mental " status is at baseline.   Psychiatric:         Mood and Affect: Mood normal.         Behavior: Behavior normal.          Result Review  Data Reviewed:{ Labs  Result Review  Imaging  Med Tab  Media :23}                   Assessment and Plan {CC Problem List  Visit Diagnosis  ROS  Review (Popup)  Health Maintenance  Quality  BestPractice  Medications  SmartSets  SnapShot Encounters  Media :23}   Diagnoses and all orders for this visit:    1. Routine health maintenance (Primary)    2. Gender dysphoria in adult    3. Hormone imbalance    4. Bronchospasm    5. Screening for diabetes mellitus  -     Comprehensive Metabolic Panel; Future    6. Encounter for lipid screening for cardiovascular disease  -     Lipid Panel; Future    7. Therapeutic drug monitoring  -     Comprehensive Metabolic Panel; Future  -     Lipid Panel; Future  -     CBC (No Diff); Future  -     Testosterone; Future    Orders as above. I will contact him with results when available. He will plan on getting labs checked in the next week or so. Continue regimen as prescribed otherwise.    Discussed various etiologies of his shortness of breath. I do think that is likely irritant bronchospasm. Can try an albuterol inhaler or perhaps some montelukast if he would like. He will let me know if it gets any worse. He will try to be more consistent with his fluticasone in the short-term.    BMI is within normal parameters. No other follow-up for BMI required.    Encouraged to continue working on healthy lifestyle habits. Recommended follow up as below. Encouraged communication via [employer] in the meantime.     Patient was given instructions and counseling regarding his condition or for health maintenance advice. Please see specific information pulled into the AVS (placed there by myself) if appropriate.    Return in about 1 year (around 11/8/2024), or if symptoms worsen or fail to improve, for Preventive Health Maintenance.    ARELIS Vincent  MD    Prevention counseling performed as below: Mindfulness for stress management.

## 2023-12-01 DIAGNOSIS — G47.33 OBSTRUCTIVE SLEEP APNEA: Primary | ICD-10-CM

## 2023-12-13 ENCOUNTER — CLINICAL SUPPORT (OUTPATIENT)
Dept: FAMILY MEDICINE CLINIC | Facility: CLINIC | Age: 43
End: 2023-12-13
Payer: COMMERCIAL

## 2023-12-13 DIAGNOSIS — E34.9 HORMONE IMBALANCE: ICD-10-CM

## 2023-12-13 DIAGNOSIS — F64.0 GENDER DYSPHORIA IN ADULT: Primary | ICD-10-CM

## 2023-12-13 NOTE — PROGRESS NOTES
Patient presented today for Aveed injection. Injection performed by Dr. Vincent, per patient's request. Patient tolerated well.

## 2023-12-26 RX ORDER — RABEPRAZOLE SODIUM 20 MG/1
20 TABLET, DELAYED RELEASE ORAL DAILY
Qty: 90 TABLET | Refills: 3 | OUTPATIENT
Start: 2023-12-26

## 2023-12-26 NOTE — TELEPHONE ENCOUNTER
Caller: Alexander Khalil    Relationship: Self    Best call back number: [phone]     Requested Prescriptions:   Requested Prescriptions     Pending Prescriptions Disp Refills    RABEprazole (ACIPHEX) 20 MG EC tablet 90 tablet 3     Sig: Take 1 tablet by mouth Daily.        Pharmacy where request should be sent:  [email or web address] ON [facility]    Last office visit with prescribing clinician: 12/13/2022   Last telemedicine visit with prescribing clinician: Visit date not found   Next office visit with prescribing clinician: 4/9/2024     Additional details provided by patient: PT HAS LESS THAN 3 DAYS LEFT ON CURRENT SCRIPT. PT HAS AN APPT IN APRIL WITH DR. DGIGS. CAN WE PLEASE SEND HIM ENOUGH TO GET HIM THROUGH UNTIL THAT APPT.    Does the patient have less than a 3 day supply:  [x] Yes  [] No    Would you like a call back once the refill request has been completed: [] Yes [x] No    If the office needs to give you a call back, can they leave a voicemail: [] Yes [x] No    Kelly Frias Rep   12/26/23 16:20 EST

## 2023-12-27 DIAGNOSIS — F90.9 ADULT ADHD: ICD-10-CM

## 2023-12-27 RX ORDER — METHYLPHENIDATE HYDROCHLORIDE EXTENDED RELEASE 10 MG/1
10 TABLET ORAL 2 TIMES DAILY
Qty: 60 TABLET | Refills: 0 | OUTPATIENT
Start: 2023-12-27

## 2023-12-27 RX ORDER — METHYLPHENIDATE HYDROCHLORIDE EXTENDED RELEASE 10 MG/1
10 TABLET ORAL 2 TIMES DAILY
Qty: 60 TABLET | Refills: 0 | Status: SHIPPED | OUTPATIENT
Start: 2023-12-27

## 2023-12-27 RX ORDER — RABEPRAZOLE SODIUM 20 MG/1
20 TABLET, DELAYED RELEASE ORAL DAILY
Qty: 90 TABLET | Refills: 3 | Status: SHIPPED | OUTPATIENT
Start: 2023-12-27

## 2024-01-26 DIAGNOSIS — J30.89 NON-SEASONAL ALLERGIC RHINITIS DUE TO OTHER ALLERGIC TRIGGER: Primary | ICD-10-CM

## 2024-01-26 RX ORDER — PREDNISONE 20 MG/1
40 TABLET ORAL DAILY
Qty: 10 TABLET | Refills: 0 | Status: SHIPPED | OUTPATIENT
Start: 2024-01-26 | End: 2024-01-31

## 2024-02-07 ENCOUNTER — OFFICE VISIT (OUTPATIENT)
Dept: SLEEP MEDICINE | Facility: HOSPITAL | Age: 44
End: 2024-02-07
Payer: COMMERCIAL

## 2024-02-07 VITALS — OXYGEN SATURATION: 99 % | HEART RATE: 79 BPM | WEIGHT: 136 LBS | BODY MASS INDEX: 23.22 KG/M2 | HEIGHT: 64 IN

## 2024-02-07 DIAGNOSIS — G47.33 OBSTRUCTIVE SLEEP APNEA: Primary | ICD-10-CM

## 2024-02-07 PROCEDURE — G0463 HOSPITAL OUTPT CLINIC VISIT: HCPCS

## 2024-02-07 NOTE — PROGRESS NOTES
"Follow Up Sleep Disorders Center Note     Chief Complaint:  TWYLA     Primary Care Physician: Yossi Vincent MD    Interval History:   The patient is a 43 y.o. male  who was last seen in the sleep lab: 2/1/2023.  Presents today for annual follow-up of TWYLA.  2021 HST showed AHI 6.2 supine AHI 9.3; advised auto CPAP then changed to set pressure of 9.  Today reports bedtime 1 AM to 2 AM wake time 7 AM reports improvement since last visit.  Some sinus issues noted.  Has 2 machines that he uses.    Downloaded PAP Data Reviewed For Compliance:  DME is [facility].  Downloads between 1/7/2024 - 2/5/2024.  Average usage is 6 hours 45 minutes.  Average AHI is 2.1.  Average auto CPAP pressure is 9 cm H2O    I have reviewed the above results and compared them with the patient's last downloads and reviewed with the patient.    Review of Systems:    A complete review of systems was done and all were negative with the exception of anxiety nasal congestion    Social History:    Social History     Socioeconomic History    Marital status: Unknown   Tobacco Use    Smoking status: Never    Smokeless tobacco: Never   Substance and Sexual Activity    Alcohol use: Not Currently    Drug use: Never    Sexual activity: Yes     Partners: Female     Birth control/protection: None       Allergies:  Fish-derived products, Sulfa antibiotics, and Latex     Medication Review:  Reviewed.      Vital Signs:    Vitals:    02/07/24 0930   Pulse: 79   SpO2: 99%   Weight: 61.7 kg (136 lb)   Height: 162.6 cm (64\")     Body mass index is 23.34 kg/m².    Physical Exam:    Constitutional:  Well developed 43 y.o. male that appears in no apparent distress.  Awake & oriented times 3.  Normal mood with normal recent and remote memory and normal judgement.  Eyes:  Conjunctivae normal.  Oropharynx: Previously, moist mucous membranes.    Self-administered [place] Sleepiness Scale test results: 15  0-5 Lower normal daytime sleepiness  6-10 Higher normal daytime " sleepiness  11-12 Mild, 13-15 Moderate, & 16-24 Severe excessive daytime sleepiness    Impression:   1. Obstructive sleep apnea        Obstructive sleep apnea adequately treated with CPAP 9. The patient appears to be at goal with good compliance and usage.  Some residual hypersomnia.  Although significantly better with CPAP.  Patient may talk to ENT about repairing deviated septum and consider workup for sinus issues; feels like his sinuses are collapsing.  Patient will talk to ENT about this further.  Continue methylphenidate via PCP for ADHD; can consider increasing dosage for any residual symptoms.    Plan:  Good sleep hygiene measures should be maintained.      After evaluating the patient and assessing results available, the patient is benefiting from the treatment being provided.     The patient will continue CPAP.  After clinical evaluation and review of downloads, I recommend no changes to the patient's pressures.  A new prescription will be sent to the patient's DME.    Caution during activities that require prolonged concentration is strongly advised if sleepiness returns. Changing of PAP supplies regularly is important for effective use. Patient needs to change cushion on the mask or plugs on nasal pillows along with disposable filters once every month and change mask frame, tubing, headgear and Velcro straps every 6 months at the minimum.    I answered all of the patient's questions.  The patient will call for any problems and will follow up in 1 year.      Suzan Roberts MD  Sleep Medicine  02/07/24  09:59 EST

## 2024-03-02 DIAGNOSIS — N32.81 DETRUSOR MUSCLE HYPERTONIA: ICD-10-CM

## 2024-03-04 RX ORDER — MIRABEGRON 50 MG/1
TABLET, FILM COATED, EXTENDED RELEASE ORAL
Qty: 90 TABLET | Refills: 0 | Status: SHIPPED | OUTPATIENT
Start: 2024-03-04

## 2024-03-05 ENCOUNTER — CLINICAL SUPPORT (OUTPATIENT)
Dept: FAMILY MEDICINE CLINIC | Facility: CLINIC | Age: 44
End: 2024-03-05
Payer: COMMERCIAL

## 2024-03-05 DIAGNOSIS — F90.9 ADULT ADHD: ICD-10-CM

## 2024-03-05 DIAGNOSIS — E34.9 HORMONE IMBALANCE: ICD-10-CM

## 2024-03-05 DIAGNOSIS — F64.0 GENDER DYSPHORIA IN ADULT: Primary | ICD-10-CM

## 2024-03-05 RX ORDER — METHYLPHENIDATE HYDROCHLORIDE EXTENDED RELEASE 10 MG/1
10 TABLET ORAL 2 TIMES DAILY
Qty: 60 TABLET | Refills: 0 | Status: SHIPPED | OUTPATIENT
Start: 2024-03-05

## 2024-03-05 NOTE — PROGRESS NOTES
Patient arrived today for AVEED injection and requested Dr. Vincent perform a dorsogluteal injection. Injection documented in patient's chart. Patient tolerated injection well, per Dr. Vincent.

## 2024-04-09 ENCOUNTER — OFFICE VISIT (OUTPATIENT)
Dept: GASTROENTEROLOGY | Facility: CLINIC | Age: 44
End: 2024-04-09
Payer: COMMERCIAL

## 2024-04-09 VITALS
HEIGHT: 64 IN | WEIGHT: 138.8 LBS | HEART RATE: 80 BPM | SYSTOLIC BLOOD PRESSURE: 133 MMHG | DIASTOLIC BLOOD PRESSURE: 87 MMHG | BODY MASS INDEX: 23.7 KG/M2 | TEMPERATURE: 97.7 F

## 2024-04-09 DIAGNOSIS — K21.9 GASTROESOPHAGEAL REFLUX DISEASE WITHOUT ESOPHAGITIS: Primary | ICD-10-CM

## 2024-04-09 PROCEDURE — 99213 OFFICE O/P EST LOW 20 MIN: CPT | Performed by: INTERNAL MEDICINE

## 2024-04-09 NOTE — PROGRESS NOTES
Chief Complaint   Patient presents with   • Heartburn   • Difficulty Swallowing       Alexander Khalil is a  43 y.o. male here for a follow up visit for reflux.    HPI    Patient 43-year-old transgender male with history of depression, ADHD and IBS complaining of intermittent chest discomfort no true dysphagia noted but does feel that sometimes food will get slow down in either the throat or in the chest.  Patient equilibrates this with reflux symptoms and with a history of negative upper GI endoscopy.  Patient reports he did get improvement in his symptoms since changed to rabeprazole though still has breakthrough symptoms.  Patient reports sometimes with chest pressure will take some Tums with eventual improvement.    Past Medical History:   Diagnosis Date   • ADHD (attention deficit hyperactivity disorder)    • Anxiety    • Compulsive disorder    • Depression    • Gender dysphoria    • GERD (gastroesophageal reflux disease)    • Headache 2010   • History of medical problems 2001    OCD   • Irritable bowel syndrome 2007   • Neurogenic bladder          Current Outpatient Medications:   •  buPROPion XL (WELLBUTRIN XL) 150 MG 24 hr tablet, Take 1 tablet by mouth Daily., Disp: 90 tablet, Rfl: 3  •  cetirizine (zyrTEC) 10 MG tablet, , Disp: , Rfl:   •  finasteride (PROPECIA) 1 MG tablet, Take 1 tablet by mouth Daily., Disp: 30 tablet, Rfl: 0  •  fluticasone (FLONASE) 50 MCG/ACT nasal spray, 2 sprays into the nostril(s) as directed by provider Daily., Disp: , Rfl:   •  ketoconazole (NIZORAL) 2 % shampoo, APPLY TO FACE DAILY, LET SIT FOR 5 MINUTES THEN RINSE, Disp: , Rfl:   •  methylphenidate ER (METADATE ER) 10 MG CR tablet, Take 1 tablet by mouth 2 (Two) Times a Day., Disp: 60 tablet, Rfl: 0  •  Myrbetriq 50 MG tablet sustained-release 24 hour 24 hr tablet, TAKE 1 TABLET BY MOUTH ONE TIME DAILY, Disp: 90 tablet, Rfl: 0  •  olopatadine (PATADAY) 0.2 % solution ophthalmic solution, Administer 1 drop to both eyes Daily., Disp:  2.5 mL, Rfl: 1  •  ondansetron ODT (ZOFRAN-ODT) 4 MG disintegrating tablet, Place 1 tablet on the tongue Every 8 (Eight) Hours As Needed for Nausea or Vomiting., Disp: 12 tablet, Rfl: 1  •  RABEprazole (ACIPHEX) 20 MG EC tablet, Take 1 tablet by mouth Daily., Disp: 90 tablet, Rfl: 3  •  Testosterone Undecanoate 750 MG/3ML solution, Inject 750 mg every 10 weeks. Next dose due 06/06/23., Disp: 3 mL, Rfl: 3  •  Mirabegron ER (Myrbetriq) 50 MG tablet sustained-release 24 hour 24 hr tablet, Take 50 mg by mouth Daily. (Patient not taking: Reported on 4/9/2024), Disp: 90 tablet, Rfl: 3    Current Facility-Administered Medications:   •  Testosterone Undecanoate solution 3 mL, 3 mL, Intramuscular, Q3 Months, Yossi Vincent MD, 3 mL at 03/05/24 1233    Allergies   Allergen Reactions   • Fish-Derived Products Anaphylaxis     Eel   • Sulfa Antibiotics Unknown - Low Severity     difficulty breathing, made me funny     • Latex Rash     Skin irritation          Social History     Socioeconomic History   • Marital status: Unknown   Tobacco Use   • Smoking status: Never   • Smokeless tobacco: Never   Substance and Sexual Activity   • Alcohol use: Not Currently   • Drug use: Never   • Sexual activity: Yes     Partners: Female     Birth control/protection: None       Family History   Problem Relation Age of Onset   • Heart disease Mother    • Atrial fibrillation Mother    • Mitral valve prolapse Mother    • Heart failure Mother    • Uterine cancer Mother    • Anxiety disorder Mother    • Cancer Mother    • Hyperlipidemia Mother    • Miscarriages / Stillbirths Mother         Lost several pregnancies   • Heart attack Father    • Skin cancer Father    • COPD Father    • Heart disease Father    • Hyperlipidemia Father    • Other Father         OCD   • Hearing loss Father    • Skin cancer Brother    • Other Brother         OCD   • Skin cancer Paternal Grandmother    • Heart failure Paternal Grandmother    • Arthritis Paternal  Grandmother    • Depression Paternal Grandmother    • Hearing loss Paternal Grandmother    • Heart disease Paternal Grandmother    • Other Paternal Grandmother         OCD   • Alcohol abuse Maternal Uncle    • Liver disease Maternal Uncle    • Alcohol abuse Maternal Uncle    • Liver disease Maternal Uncle    • Anxiety disorder Daughter    • Learning disabilities Daughter         ADHD   • Anxiety disorder Sister    • Anxiety disorder Maternal Grandmother    • Developmental Disability Daughter    • Learning disabilities Daughter         Intellectual disability   • Vision loss Paternal Grandfather        Review of Systems   Constitutional: Negative.    Respiratory: Negative.     Cardiovascular: Negative.    Gastrointestinal: Negative.    Musculoskeletal: Negative.    Skin: Negative.    Hematological: Negative.      Vitals:    04/09/24 1434   BP: 133/87   Pulse: 80   Temp: 97.7 °F (36.5 °C)       Physical Exam  Vitals reviewed.   Constitutional:       Appearance: Normal appearance. He is well-developed.   HENT:      Head: Normocephalic and atraumatic.      Mouth/Throat:      Mouth: Mucous membranes are moist.   Eyes:      General: No scleral icterus.     Pupils: Pupils are equal, round, and reactive to light.   Pulmonary:      Effort: Pulmonary effort is normal. No respiratory distress.   Abdominal:      General: Bowel sounds are normal. There is no distension.      Palpations: Abdomen is soft.      Tenderness: There is no abdominal tenderness.   Skin:     General: Skin is warm and dry.      Coloration: Skin is not jaundiced.      Findings: No rash.   Neurological:      General: No focal deficit present.      Mental Status: He is alert and oriented to person, place, and time.      Cranial Nerves: No cranial nerve deficit.   Psychiatric:         Mood and Affect: Mood normal.         Behavior: Behavior normal.         Thought Content: Thought content normal.     No visits with results within 2 Month(s) from this visit.    Latest known visit with results is:   Results Encounter on 11/13/2023   Component Date Value Ref Range Status   • Glucose 11/22/2023 107 (H)  70 - 99 mg/dL Final   • BUN 11/22/2023 14  6 - 24 mg/dL Final   • Creatinine 11/22/2023 0.99  0.76 - 1.27 mg/dL Final   • EGFR Result 11/22/2023 98  >59 mL/min/1.73 Final   • BUN/Creatinine Ratio 11/22/2023 14  9 - 20 Final   • Sodium 11/22/2023 141  134 - 144 mmol/L Final   • Potassium 11/22/2023 4.2  3.5 - 5.2 mmol/L Final   • Chloride 11/22/2023 101  96 - 106 mmol/L Final   • Total CO2 11/22/2023 24  20 - 29 mmol/L Final   • Calcium 11/22/2023 9.4  8.7 - 10.2 mg/dL Final   • Total Protein 11/22/2023 6.9  6.0 - 8.5 g/dL Final   • Albumin 11/22/2023 4.7  4.1 - 5.1 g/dL Final   • Globulin 11/22/2023 2.2  1.5 - 4.5 g/dL Final   • A/G Ratio 11/22/2023 2.1  1.2 - 2.2 Final   • Total Bilirubin 11/22/2023 0.4  0.0 - 1.2 mg/dL Final   • Alkaline Phosphatase 11/22/2023 71  44 - 121 IU/L Final   • AST (SGOT) 11/22/2023 16  0 - 40 IU/L Final   • ALT (SGPT) 11/22/2023 15  0 - 44 IU/L Final   • Total Cholesterol 11/22/2023 224 (H)  100 - 199 mg/dL Final   • Triglycerides 11/22/2023 106  0 - 149 mg/dL Final   • HDL Cholesterol 11/22/2023 55  >39 mg/dL Final   • VLDL Cholesterol Demond 11/22/2023 19  5 - 40 mg/dL Final   • LDL Chol Calc (NIH) 11/22/2023 150 (H)  0 - 99 mg/dL Final   • WBC 11/22/2023 4.8  3.4 - 10.8 x10E3/uL Final   • RBC 11/22/2023 5.13  4.14 - 5.80 x10E6/uL Final   • Hemoglobin 11/22/2023 14.8  13.0 - 17.7 g/dL Final   • Hematocrit 11/22/2023 44.7  37.5 - 51.0 % Final   • MCV 11/22/2023 87  79 - 97 fL Final   • MCH 11/22/2023 28.8  26.6 - 33.0 pg Final   • MCHC 11/22/2023 33.1  31.5 - 35.7 g/dL Final   • RDW 11/22/2023 12.4  11.6 - 15.4 % Final   • Platelets 11/22/2023 230  150 - 450 x10E3/uL Final   • Testosterone, Total 11/22/2023 500  264 - 916 ng/dL Final       Diagnoses and all orders for this visit:    1. Gastroesophageal reflux disease without esophagitis  (Primary)  -     FL ESOPHAGRAM SINGLE CONTRAST; Future      Patient 43-year-old male with history of neurogenic bladder, GERD, gender dysphoria with ongoing symptoms of reflux and dysphagia here for follow-up.  Patient reports on Aciphex has had marked improvement though still occasionally gets symptoms of atypical reflux.  Patient reports some throat issues and some chest pressure though endoscopically patient was negative on EGD.  At this point would recommend arranging esophagram to evaluate esophageal motility as well as anatomy for signs of stenosis.  Will follow-up clinically based on findings.   Additional Information: (Optional): The wound was cleaned, and a pressure dressing was applied.  The patient received detailed post-op instructions.

## 2024-04-12 DIAGNOSIS — F90.9 ADULT ADHD: Primary | ICD-10-CM

## 2024-04-12 RX ORDER — METHYLPHENIDATE HYDROCHLORIDE 20 MG/1
20 CAPSULE, EXTENDED RELEASE ORAL EVERY MORNING
Qty: 90 CAPSULE | Refills: 0 | Status: SHIPPED | OUTPATIENT
Start: 2024-04-12

## 2024-04-18 DIAGNOSIS — E34.9 HORMONE IMBALANCE: ICD-10-CM

## 2024-04-18 DIAGNOSIS — F64.0 GENDER DYSPHORIA IN ADULT: ICD-10-CM

## 2024-04-18 RX ORDER — TESTOSTERONE UNDECANOATE 250 MG/ML
INJECTION INTRAMUSCULAR
Qty: 3 ML | Refills: 3 | Status: SHIPPED | OUTPATIENT
Start: 2024-04-18

## 2024-04-29 ENCOUNTER — HOSPITAL ENCOUNTER (OUTPATIENT)
Dept: GENERAL RADIOLOGY | Facility: HOSPITAL | Age: 44
Discharge: HOME | End: 2024-04-29
Admitting: INTERNAL MEDICINE
Payer: COMMERCIAL

## 2024-04-29 DIAGNOSIS — K21.9 GASTROESOPHAGEAL REFLUX DISEASE WITHOUT ESOPHAGITIS: ICD-10-CM

## 2024-04-29 PROCEDURE — 74220 X-RAY XM ESOPHAGUS 1CNTRST: CPT

## 2024-04-29 RX ADMIN — BARIUM SULFATE 700 MG: 700 TABLET ORAL at 09:19

## 2024-04-29 RX ADMIN — BARIUM SULFATE 183 ML: 960 POWDER, FOR SUSPENSION ORAL at 09:19

## 2024-05-07 DIAGNOSIS — F90.9 ADULT ADHD: ICD-10-CM

## 2024-05-07 RX ORDER — METHYLPHENIDATE HYDROCHLORIDE 10 MG/1
10 CAPSULE, EXTENDED RELEASE ORAL 2 TIMES DAILY
Qty: 60 CAPSULE | Refills: 0 | Status: SHIPPED | OUTPATIENT
Start: 2024-05-07

## 2024-05-08 ENCOUNTER — TELEPHONE (OUTPATIENT)
Dept: FAMILY MEDICINE CLINIC | Facility: CLINIC | Age: 44
End: 2024-05-08
Payer: COMMERCIAL

## 2024-05-09 ENCOUNTER — TELEPHONE (OUTPATIENT)
Dept: GASTROENTEROLOGY | Facility: CLINIC | Age: 44
End: 2024-05-09
Payer: COMMERCIAL

## 2024-05-15 ENCOUNTER — OFFICE VISIT (OUTPATIENT)
Dept: FAMILY MEDICINE CLINIC | Facility: CLINIC | Age: 44
End: 2024-05-15
Payer: COMMERCIAL

## 2024-05-15 VITALS
SYSTOLIC BLOOD PRESSURE: 132 MMHG | WEIGHT: 133 LBS | BODY MASS INDEX: 23.57 KG/M2 | RESPIRATION RATE: 18 BRPM | DIASTOLIC BLOOD PRESSURE: 78 MMHG | HEART RATE: 77 BPM | OXYGEN SATURATION: 96 % | HEIGHT: 63 IN

## 2024-05-15 DIAGNOSIS — F90.9 ADULT ADHD: ICD-10-CM

## 2024-05-15 DIAGNOSIS — R07.89 ATYPICAL CHEST PAIN: ICD-10-CM

## 2024-05-15 DIAGNOSIS — F41.1 GENERALIZED ANXIETY DISORDER: Primary | ICD-10-CM

## 2024-05-15 DIAGNOSIS — M25.561 POSTERIOR RIGHT KNEE PAIN: ICD-10-CM

## 2024-05-15 PROCEDURE — 99214 OFFICE O/P EST MOD 30 MIN: CPT | Performed by: FAMILY MEDICINE

## 2024-05-15 RX ORDER — BUPROPION HYDROCHLORIDE 150 MG/1
150 TABLET ORAL DAILY
Qty: 90 TABLET | Refills: 3 | Status: SHIPPED | OUTPATIENT
Start: 2024-05-15

## 2024-05-15 NOTE — PROGRESS NOTES
"Chief Complaint  ADHD (Med review ) and Knee Pain (R knee x3-4 weeks )    Subjective    History of Present Illness    Alexander Khalil presents to [facility] for ADHD (Med review ) and Knee Pain (R knee x3-4 weeks ).  History of Present Illness     Here to discuss a number of issues. Has been doing fairly well overall. Continues on methylphenidate for management of ADHD. Feels that the current dose is working fairly well. Did not tolerate 20 mg, had significant palpitations and tachycardia. PDMP is reviewed and appropriate. No unwanted side effects. No need for refill at this time.    Has been experiencing some R knee pain after kickball double-header. Pain is in the posterior knee, has been slowly getting better but remains bothersome at times. Worried that he might have injured himself. No consistent aggravating or alleviating factors. Does take occasional over-the-counter pain medications and has used ice, both of which have been useful.    Has a strong family history of cardiac disease, is worried about his own cardiac risk factors. Labs have been overall reassuring. Has intermittent left-sided chest pain that is not associated with exertion. It is enough to scare him however and he would like to discuss further workup options. Interested in a referral to cardiology.    Mental health is overall stable. Continues on bupropion as prescribed. Needs a refill today.    Objective     Vital Signs:   /78   Pulse 77   Resp 18   Ht 160 cm (63\")   Wt 60.3 kg (133 lb)   SpO2 96%   BMI 23.56 kg/m²   Physical Exam  Vitals and nursing note reviewed.   Constitutional:       General: He is not in acute distress.     Appearance: Normal appearance. He is not ill-appearing.   Cardiovascular:      Rate and Rhythm: Normal rate and regular rhythm.      Pulses: Normal pulses.      Heart sounds: Normal heart sounds. No murmur heard.  Pulmonary:      Effort: Pulmonary effort is normal. No respiratory " distress.      Breath sounds: Normal breath sounds. No rales.   Musculoskeletal:      Left knee: Normal.      Comments: Slight tenderness in the posterior knee around the insertion of the hamstring and gastrocnemius. Negative testing for meniscal injury. No ligamentous laxity.   Neurological:      Mental Status: He is alert and oriented to person, place, and time. Mental status is at baseline.   Psychiatric:         Mood and Affect: Mood normal.         Behavior: Behavior normal.                 Assessment and Plan   Diagnoses and all orders for this visit:    1. Generalized anxiety disorder (Primary)  -     buPROPion XL (WELLBUTRIN XL) 150 MG 24 hr tablet; Take 1 tablet by mouth Daily.  Dispense: 90 tablet; Refill: 3    2. Atypical chest pain  -     Ambulatory Referral to Cardiology    3. Adult ADHD    4. Posterior right knee pain    Orders as above. Continue regimen as prescribed. Referral to cardiology as discussed.    Anticipate knee pain resolution with time. Continue over-the-counter pain medications and ice as needed.    Recommended follow up as below. Encouraged communication via MyChart in the meantime.     Patient was given instructions and counseling regarding his condition or for health maintenance advice. Please see specific information pulled into the AVS (placed there by myself) if appropriate.    Return in about 6 months (around 11/15/2024), or if symptoms worsen or fail to improve.    ARELIS Vincent MD

## 2024-06-13 DIAGNOSIS — F41.1 GENERALIZED ANXIETY DISORDER: ICD-10-CM

## 2024-06-14 RX ORDER — FINASTERIDE 1 MG/1
1 TABLET, FILM COATED ORAL DAILY
Qty: 30 TABLET | Refills: 0 | Status: SHIPPED | OUTPATIENT
Start: 2024-06-14

## 2024-06-14 RX ORDER — BUPROPION HYDROCHLORIDE 150 MG/1
150 TABLET ORAL DAILY
Qty: 90 TABLET | Refills: 3 | Status: SHIPPED | OUTPATIENT
Start: 2024-06-14

## 2024-06-21 DIAGNOSIS — N32.81 DETRUSOR MUSCLE HYPERTONIA: ICD-10-CM

## 2024-06-21 RX ORDER — MIRABEGRON 50 MG/1
50 TABLET, EXTENDED RELEASE ORAL DAILY
Qty: 90 TABLET | Refills: 0 | Status: SHIPPED | OUTPATIENT
Start: 2024-06-21

## 2024-06-24 ENCOUNTER — CLINICAL SUPPORT (OUTPATIENT)
Dept: FAMILY MEDICINE CLINIC | Facility: CLINIC | Age: 44
End: 2024-06-24
Payer: COMMERCIAL

## 2024-06-24 DIAGNOSIS — E34.9 HORMONE IMBALANCE: ICD-10-CM

## 2024-06-24 DIAGNOSIS — F64.0 GENDER DYSPHORIA IN ADULT: Primary | ICD-10-CM

## 2024-06-24 PROCEDURE — 96372 THER/PROPH/DIAG INJ SC/IM: CPT | Performed by: FAMILY MEDICINE

## 2024-06-24 NOTE — PROGRESS NOTES
Patient presented to office today for Aveed injection, administered every 10-12 weeks. Patient requested for injection to be given in the dorsogluteal muscle. Injection performed by Dr. Vincent, patient's PCP, and documented by MIESHA RAMOS. Patient tolerated injection well.

## 2024-07-05 ENCOUNTER — OFFICE VISIT (OUTPATIENT)
Dept: CARDIOLOGY | Facility: CLINIC | Age: 44
End: 2024-07-05
Payer: COMMERCIAL

## 2024-07-05 VITALS
RESPIRATION RATE: 18 BRPM | OXYGEN SATURATION: 98 % | HEART RATE: 90 BPM | HEIGHT: 63 IN | DIASTOLIC BLOOD PRESSURE: 84 MMHG | SYSTOLIC BLOOD PRESSURE: 118 MMHG | BODY MASS INDEX: 23.74 KG/M2 | WEIGHT: 134 LBS

## 2024-07-05 DIAGNOSIS — Z82.49 FAMILY HISTORY OF CORONARY ARTERY DISEASE: ICD-10-CM

## 2024-07-05 DIAGNOSIS — R07.2 PRECORDIAL PAIN: Primary | ICD-10-CM

## 2024-07-05 PROCEDURE — 93000 ELECTROCARDIOGRAM COMPLETE: CPT | Performed by: INTERNAL MEDICINE

## 2024-07-05 PROCEDURE — 99204 OFFICE O/P NEW MOD 45 MIN: CPT | Performed by: INTERNAL MEDICINE

## 2024-07-05 RX ORDER — FEXOFENADINE HCL 180 MG/1
180 TABLET ORAL DAILY
COMMUNITY

## 2024-07-05 NOTE — PROGRESS NOTES
[facility]      Patient Name: Alexander Khalil  :1980  Age: 43 y.o.  Encounter Provider:  Jamaal Salinas Jr, MD      Chief Complaint: Evaluation of chest discomfort      HPI  Alexander Khalil is a 43 y.o. male past medical history of hormone replacement therapy, ADHD and family history of ischemic heart disease who presents for evaluation of chest discomfort.  Patient has random atypical episodes of sharp chest discomfort in the left upper anterior thoracic region.  As stated above no relationship to physical activity.  No orthopnea, PND or edema.  No palpitations, dizziness or syncope.  Patient has a strong family history of coronary disease with father being diagnosed in his 50s status post CABG and subsequent PCI.  Mother with history of heart failure and mitral valve prolapse.  He is a lifelong non-smoker with no alcohol or illicit drug use.  Blood pressure and heart rate are well-controlled today in clinic.      The following portions of the patient's history were reviewed and updated as appropriate: allergies, current medications, past family history, past medical history, past social history, past surgical history and problem list.      Review of Systems   Constitutional: Negative for chills and fever.   HENT:  Negative for hoarse voice and sore throat.    Eyes:  Negative for double vision and photophobia.   Cardiovascular:  Positive for chest pain. Negative for leg swelling, near-syncope, orthopnea, palpitations, paroxysmal nocturnal dyspnea and syncope.   Respiratory:  Negative for cough and wheezing.    Skin:  Negative for poor wound healing and rash.   Musculoskeletal:  Negative for arthritis and joint swelling.   Gastrointestinal:  Negative for bloating, abdominal pain, hematemesis and hematochezia.   Neurological:  Negative for dizziness and focal weakness.   Psychiatric/Behavioral:  Negative for depression and suicidal ideas.      OBJECTIVE:   Vital Signs  Vitals:    24 1422  "  BP: 118/84   Pulse: 90   Resp: 18   SpO2: 98%     Estimated body mass index is 23.74 kg/m² as calculated from the following:    Height as of this encounter: 160 cm (63\").    Weight as of this encounter: 60.8 kg (134 lb).    Vitals reviewed.   Constitutional:       Appearance: Healthy appearance. Not in distress.   Neck:      Vascular: No JVR. JVD normal.   Pulmonary:      Effort: Pulmonary effort is normal.      Breath sounds: Normal breath sounds. No wheezing. No rhonchi. No rales.   Chest:      Chest wall: Not tender to palpatation.   Cardiovascular:      PMI at left midclavicular line. Normal rate. Regular rhythm. Normal S1. Normal S2.       Murmurs: There is no murmur.      No gallop.  No click. No rub.   Pulses:     Intact distal pulses.   Edema:     Peripheral edema absent.   Abdominal:      General: Bowel sounds are normal.      Palpations: Abdomen is soft.      Tenderness: There is no abdominal tenderness.   Musculoskeletal: Normal range of motion.         General: No tenderness. Skin:     General: Skin is warm and dry.   Neurological:      General: No focal deficit present.      Mental Status: Alert and oriented to person, place and time.       ECG 12 Lead    Date/Time: 7/5/2024 4:12 PM  Performed by: Jamaal Salinas Jr., MD    Authorized by: Joel Washburn MD  Comparison: not compared with previous ECG   Previous ECG: no previous ECG available  Rhythm: sinus rhythm  Other findings: non-specific ST-T wave changes    Clinical impression: non-specific ECG      Lipid Panel          11/22/2023    13:58   Lipid Panel   Total Cholesterol 224    Triglycerides 106    HDL Cholesterol 55    VLDL Cholesterol 19    LDL Cholesterol  150         BUN   Date Value Ref Range Status   11/22/2023 14 6 - 24 mg/dL Final     Creatinine   Date Value Ref Range Status   11/22/2023 0.99 0.76 - 1.27 mg/dL Final     Potassium   Date Value Ref Range Status   11/22/2023 4.2 3.5 - 5.2 mmol/L Final     ALT (SGPT)   Date Value Ref " Range Status   11/22/2023 15 0 - 44 IU/L Final     AST (SGOT)   Date Value Ref Range Status   11/22/2023 16 0 - 40 IU/L Final           ASSESSMENT:     43-year-old transgender male with strong family history of coronary artery disease presents for evaluation of atypical chest discomfort      PLAN OF CARE:     Precordial pain -low risk characteristics in patient with strong family history of coronary artery disease.  We discussed screening strategies and he would like to move forward with coronary calcium score.  If elevated we will consider stress study as well as optimal medical therapy.  Follow diagnostic testing results for further treatment recommendations.  ADHD  Family history coronary artery disease    Return to clinic 6 months             Discharge Medications            Accurate as of July 5, 2024  2:50 PM. If you have any questions, ask your nurse or doctor.                Continue These Medications        Instructions Start Date   Aveed 750 MG/3ML solution  Generic drug: Testosterone Undecanoate   INJECT 3 ML INTO THE MUSCLE EVERY 10 WEEKS.      buPROPion  MG 24 hr tablet  Commonly known as: WELLBUTRIN XL   150 mg, Oral, Daily      cetirizine 10 MG tablet  Commonly known as: zyrTEC   No dose, route, or frequency recorded.      fexofenadine 180 MG tablet  Commonly known as: ALLEGRA   180 mg, Oral, Daily      finasteride 1 MG tablet  Commonly known as: PROPECIA   1 mg, Oral, Daily      fluticasone 50 MCG/ACT nasal spray  Commonly known as: FLONASE   2 sprays, Nasal, Daily      ketoconazole 2 % shampoo  Commonly known as: NIZORAL   APPLY TO FACE DAILY, LET SIT FOR 5 MINUTES THEN RINSE      methylphenidate LA 10 MG 24 hr capsule  Commonly known as: Ritalin LA   10 mg, Oral, 2 Times Daily      Mirabegron ER 50 MG tablet sustained-release 24 hour 24 hr tablet  Commonly known as: Myrbetriq   50 mg, Oral, Daily      olopatadine 0.2 % solution ophthalmic solution  Commonly known as: PATADAY   1 drop, Both  Eyes, Daily      ondansetron ODT 4 MG disintegrating tablet  Commonly known as: ZOFRAN-ODT   4 mg, Translingual, Every 8 Hours PRN      RABEprazole 20 MG EC tablet  Commonly known as: ACIPHEX   20 mg, Oral, Daily               Thank you for allowing me to participate in the care of your patient,      Sincerely,   Jamaal Salinas Jr, MD  [facility]  07/05/24  14:50 EDT

## 2024-07-11 DIAGNOSIS — L65.9 HAIR LOSS: Primary | ICD-10-CM

## 2024-07-11 DIAGNOSIS — F90.9 ADULT ADHD: ICD-10-CM

## 2024-07-11 RX ORDER — FINASTERIDE 1 MG/1
1 TABLET, FILM COATED ORAL DAILY
Qty: 90 TABLET | Refills: 3 | Status: SHIPPED | OUTPATIENT
Start: 2024-07-11

## 2024-07-12 RX ORDER — METHYLPHENIDATE HYDROCHLORIDE 10 MG/1
10 CAPSULE, EXTENDED RELEASE ORAL 2 TIMES DAILY
Qty: 60 CAPSULE | Refills: 0 | Status: SHIPPED | OUTPATIENT
Start: 2024-07-12

## 2024-07-16 ENCOUNTER — TELEPHONE (OUTPATIENT)
Dept: CARDIOLOGY | Facility: CLINIC | Age: 44
End: 2024-07-16
Payer: COMMERCIAL

## 2024-07-16 ENCOUNTER — HOSPITAL ENCOUNTER (OUTPATIENT)
Dept: CT IMAGING | Facility: HOSPITAL | Age: 44
Discharge: HOME | End: 2024-07-16
Admitting: INTERNAL MEDICINE

## 2024-07-16 DIAGNOSIS — R07.2 PRECORDIAL PAIN: ICD-10-CM

## 2024-07-16 PROCEDURE — 75571 CT HRT W/O DYE W/CA TEST: CPT

## 2024-07-16 NOTE — PROGRESS NOTES
Please let the patient know this is a normal calcium score as expected.  No medication changes or further testing is required at this time.

## 2024-07-16 NOTE — TELEPHONE ENCOUNTER
----- Message from Jamaal Salinas sent at 7/16/2024  4:00 PM EDT -----  Please let the patient know this is a normal calcium score as expected.  No medication changes or further testing is required at this time.

## 2024-07-17 NOTE — TELEPHONE ENCOUNTER
I spoke with Alexander Khalil and updated pt on results/recommendations from provider.  Pt verbalized understanding and has no further questions at this time.    Thank you,    Akiko MACK, RN  Triage [facility]  07/17/24 09:19 EDT

## 2024-09-18 DIAGNOSIS — F90.9 ADULT ADHD: ICD-10-CM

## 2024-09-18 DIAGNOSIS — F41.1 GENERALIZED ANXIETY DISORDER: ICD-10-CM

## 2024-09-18 DIAGNOSIS — N32.81 DETRUSOR MUSCLE HYPERTONIA: ICD-10-CM

## 2024-09-18 RX ORDER — METHYLPHENIDATE HYDROCHLORIDE 10 MG/1
10 CAPSULE, EXTENDED RELEASE ORAL 2 TIMES DAILY
Qty: 60 CAPSULE | Refills: 0 | Status: SHIPPED | OUTPATIENT
Start: 2024-09-18

## 2024-09-18 RX ORDER — BUPROPION HYDROCHLORIDE 150 MG/1
150 TABLET ORAL DAILY
Qty: 90 TABLET | Refills: 3 | Status: SHIPPED | OUTPATIENT
Start: 2024-09-18

## 2024-09-18 RX ORDER — MIRABEGRON 50 MG/1
50 TABLET, EXTENDED RELEASE ORAL DAILY
Qty: 90 TABLET | Refills: 0 | Status: SHIPPED | OUTPATIENT
Start: 2024-09-18

## 2024-09-25 ENCOUNTER — CLINICAL SUPPORT (OUTPATIENT)
Dept: FAMILY MEDICINE CLINIC | Facility: CLINIC | Age: 44
End: 2024-09-25
Payer: COMMERCIAL

## 2024-09-25 DIAGNOSIS — F64.0 GENDER DYSPHORIA IN ADULT: Primary | ICD-10-CM

## 2024-09-25 DIAGNOSIS — E34.9 HORMONE IMBALANCE: ICD-10-CM

## 2024-11-11 DIAGNOSIS — E34.9 HORMONE IMBALANCE: ICD-10-CM

## 2024-11-11 DIAGNOSIS — F64.0 GENDER DYSPHORIA IN ADULT: ICD-10-CM

## 2024-11-11 RX ORDER — TESTOSTERONE UNDECANOATE 250 MG/ML
INJECTION INTRAMUSCULAR
Qty: 3 ML | Refills: 1 | Status: SHIPPED | OUTPATIENT
Start: 2024-11-11

## 2024-11-11 NOTE — TELEPHONE ENCOUNTER
Fax received from [facility] requesting refill for Aveed injection. Please advise.    LAST REFILL - 04/18/24  LAST VISIT - 05/15/24  NEXT VISIT - not scheduled

## 2024-11-13 DIAGNOSIS — F90.9 ADULT ADHD: ICD-10-CM

## 2024-11-13 RX ORDER — METHYLPHENIDATE HYDROCHLORIDE 10 MG/1
10 CAPSULE, EXTENDED RELEASE ORAL 2 TIMES DAILY
Qty: 60 CAPSULE | Refills: 0 | Status: SHIPPED | OUTPATIENT
Start: 2024-11-13

## 2024-12-27 ENCOUNTER — PRIOR AUTHORIZATION (OUTPATIENT)
Dept: FAMILY MEDICINE CLINIC | Facility: CLINIC | Age: 44
End: 2024-12-27
Payer: COMMERCIAL

## 2024-12-27 ENCOUNTER — TELEPHONE (OUTPATIENT)
Dept: FAMILY MEDICINE CLINIC | Facility: CLINIC | Age: 44
End: 2024-12-27
Payer: COMMERCIAL

## 2024-12-27 NOTE — TELEPHONE ENCOUNTER
Response received from payor: This medication may be excluded from the patient's benefit. For more information, please reach out to [employer] directly at [phone]. Message from [employer]: Drug is not covered by plan     Attempted to initiate via CoverMyMeds in case of EPA error. Same response received. Will contact patient's insurance to follow up.

## 2024-12-27 NOTE — TELEPHONE ENCOUNTER
Caller: KhalilAlexander    Relationship: Self    Best call back number: [phone] (Mobile)     Requested Prescriptions:   Testosterone Undecanoate (Aveed) 750 MG/3ML solution        Pharmacy where request should be sent: [facility] - [address] - [phone]  - [phone] FX      Last office visit with prescribing clinician: 5/15/2024   Last telemedicine visit with prescribing clinician: Visit date not found   Next office visit with prescribing clinician: Visit date not found     Additional details provided by patient: PATIENT CALLED TO ADVISE THAT THIS MEDICATION NEED A PRIOR AUTHORIZATION SENT TO [facility] FOR PATIENT TO GET THIS REFILLED.    Does the patient have less than a 3 day supply:  [x] Yes  [] No    Would you like a call back once the refill request has been completed: [] Yes [] No    If the office needs to give you a call back, can they leave a voicemail: [] Yes [] No    Kelly Toro Rep   12/27/24 12:49 EST         THANKS

## 2024-12-30 DIAGNOSIS — N32.81 DETRUSOR MUSCLE HYPERTONIA: ICD-10-CM

## 2024-12-30 RX ORDER — MIRABEGRON 50 MG/1
50 TABLET, FILM COATED, EXTENDED RELEASE ORAL DAILY
Qty: 90 TABLET | Refills: 0 | Status: SHIPPED | OUTPATIENT
Start: 2024-12-30

## 2024-12-30 NOTE — TELEPHONE ENCOUNTER
LAST REFILL - 09/18/24  LAST VISIT - 05/15/24  NEXT VISIT - not scheduled    Routing to covering provider per PCP being out of office. Please advise.

## 2024-12-31 NOTE — TELEPHONE ENCOUNTER
Attempted to contact patient's insurance to clarify benefits and request prior authorization status or renewal for patient's Aveed. Spoke with hermes who stated the patient's pharmacy benefit manager is [employer], [phone]. She requested we call them for further information.

## 2025-01-02 NOTE — TELEPHONE ENCOUNTER
Contacted [employer] to initiate or check status of PA for Aveed. Spoke with Marlyn who informed me that the patient's group does not cover Aveed and does not offer a prior authorization for this medication. Patient informed of this, he states that our office usually requests an exception or appeal of some kind.    After contacting [employer] [facility] again to inquire about a formulary exception, was transferred to benefits coverage team member Lety who helped me to submit a plan exclusion for review. Submitted, will be reviewed and resolved within 24-72 hours.    CASE ID: [id number]

## 2025-01-03 NOTE — TELEPHONE ENCOUNTER
Denial received from patient's insurance. Appeal initiated. LVM for patient to return call to discuss.

## 2025-01-17 ENCOUNTER — OFFICE VISIT (OUTPATIENT)
Dept: CARDIOLOGY | Facility: CLINIC | Age: 45
End: 2025-01-17
Payer: COMMERCIAL

## 2025-01-17 VITALS
SYSTOLIC BLOOD PRESSURE: 116 MMHG | BODY MASS INDEX: 23.57 KG/M2 | HEIGHT: 63 IN | OXYGEN SATURATION: 98 % | DIASTOLIC BLOOD PRESSURE: 80 MMHG | WEIGHT: 133 LBS | RESPIRATION RATE: 16 BRPM

## 2025-01-17 DIAGNOSIS — R07.2 PRECORDIAL PAIN: Primary | ICD-10-CM

## 2025-01-17 PROCEDURE — 99213 OFFICE O/P EST LOW 20 MIN: CPT | Performed by: INTERNAL MEDICINE

## 2025-01-17 NOTE — PROGRESS NOTES
[facility]      Patient Name: Alexander Khalil  :1980  Age: 44 y.o.  Encounter Provider:  Jamaal Salinas Jr, MD      Chief Complaint: Follow-up evaluation of chest discomfort      HPI  Alexander Khalil is a 44 y.o. male past medical history of hormone replacement therapy, ADHD and family history of ischemic heart disease who presents for follow-up evaluation of chest discomfort.      Last clinic visit note: Patient has random atypical episodes of sharp chest discomfort in the left upper anterior thoracic region.  As stated above no relationship to physical activity.  No orthopnea, PND or edema.  No palpitations, dizziness or syncope.  Patient has a strong family history of coronary disease with father being diagnosed in his 50s status post CABG and subsequent PCI.  Mother with history of heart failure and mitral valve prolapse.  He is a lifelong non-smoker with no alcohol or illicit drug use.  Blood pressure and heart rate are well-controlled today in clinic.    Coronary calcium score 0.  Still has very atypical chest discomfort without clear relationship to physical activity.  No orthopnea, PND or edema.  Occasional palpitations that are fleeting in nature with no dizziness or syncope.  Blood pressure and heart rate are fairly well-controlled today in clinic.  Last .    The following portions of the patient's history were reviewed and updated as appropriate: allergies, current medications, past family history, past medical history, past social history, past surgical history and problem list.      Review of Systems   Constitutional: Negative for chills and fever.   HENT:  Negative for hoarse voice and sore throat.    Eyes:  Negative for double vision and photophobia.   Cardiovascular:  Positive for chest pain. Negative for leg swelling, near-syncope, orthopnea, palpitations, paroxysmal nocturnal dyspnea and syncope.   Respiratory:  Negative for cough and wheezing.    Skin:  Negative for  "poor wound healing and rash.   Musculoskeletal:  Negative for arthritis and joint swelling.   Gastrointestinal:  Negative for bloating, abdominal pain, hematemesis and hematochezia.   Neurological:  Negative for dizziness and focal weakness.   Psychiatric/Behavioral:  Negative for depression and suicidal ideas.        OBJECTIVE:   Vital Signs  Vitals:    01/17/25 1134   BP: 116/80   Resp: 16   SpO2: 98%     Estimated body mass index is 23.56 kg/m² as calculated from the following:    Height as of this encounter: 160 cm (63\").    Weight as of this encounter: 60.3 kg (133 lb).    Vitals reviewed.   Constitutional:       Appearance: Healthy appearance. Not in distress.   Neck:      Vascular: No JVR. JVD normal.   Pulmonary:      Effort: Pulmonary effort is normal.      Breath sounds: Normal breath sounds. No wheezing. No rhonchi. No rales.   Chest:      Chest wall: Not tender to palpatation.   Cardiovascular:      PMI at left midclavicular line. Normal rate. Regular rhythm. Normal S1. Normal S2.       Murmurs: There is no murmur.      No gallop.  No click. No rub.   Pulses:     Intact distal pulses.   Edema:     Peripheral edema absent.   Abdominal:      General: Bowel sounds are normal.      Palpations: Abdomen is soft.      Tenderness: There is no abdominal tenderness.   Musculoskeletal: Normal range of motion.         General: No tenderness. Skin:     General: Skin is warm and dry.   Neurological:      General: No focal deficit present.      Mental Status: Alert and oriented to person, place and time.     Procedures         BUN   Date Value Ref Range Status   11/22/2023 14 6 - 24 mg/dL Final     Creatinine   Date Value Ref Range Status   11/22/2023 0.99 0.76 - 1.27 mg/dL Final     Potassium   Date Value Ref Range Status   11/22/2023 4.2 3.5 - 5.2 mmol/L Final     ALT (SGPT)   Date Value Ref Range Status   11/22/2023 15 0 - 44 IU/L Final     AST (SGOT)   Date Value Ref Range Status   11/22/2023 16 0 - 40 IU/L Final "           ASSESSMENT:     44-year-old transgender male with strong family history of coronary artery disease presents for evaluation of atypical chest discomfort      PLAN OF CARE:     Precordial pain -low risk characteristics in patient with strong family history of coronary artery disease.  Normal calcium score.  We discussed lifestyle modifications for better cholesterol control.  He has good functional capacity with no limiting symptoms.  ADHD  Family history coronary artery disease    Return to clinic 12 months             Discharge Medications            Accurate as of January 17, 2025 11:43 AM. If you have any questions, ask your nurse or doctor.                Changes to Medications        Instructions Start Date   fluticasone 50 MCG/ACT nasal spray  Commonly known as: FLONASE  What changed:   when to take this  reasons to take this   2 sprays, Daily             Continue These Medications        Instructions Start Date   Aveed 750 MG/3ML solution  Generic drug: Testosterone Undecanoate   INJECT 3 ML INTO THE MUSCLE EVERY 10 WEEKS.      buPROPion  MG 24 hr tablet  Commonly known as: WELLBUTRIN XL   150 mg, Oral, Daily      cetirizine 10 MG tablet  Commonly known as: zyrTEC   No dose, route, or frequency recorded.      fexofenadine 180 MG tablet  Commonly known as: ALLEGRA   180 mg, Daily      finasteride 1 MG tablet  Commonly known as: PROPECIA   1 mg, Oral, Daily      ketoconazole 2 % shampoo  Commonly known as: NIZORAL   APPLY TO FACE DAILY, LET SIT FOR 5 MINUTES THEN RINSE      methylphenidate LA 10 MG 24 hr capsule  Commonly known as: Ritalin LA   10 mg, Oral, 2 Times Daily      Mirabegron ER 50 MG tablet sustained-release 24 hour 24 hr tablet  Commonly known as: Myrbetriq   50 mg, Oral, Daily      olopatadine 0.2 % solution ophthalmic solution  Commonly known as: PATADAY   1 drop, Both Eyes, Daily      ondansetron ODT 4 MG disintegrating tablet  Commonly known as: ZOFRAN-ODT   4 mg, Translingual,  Every 8 Hours PRN      RABEprazole 20 MG EC tablet  Commonly known as: ACIPHEX   20 mg, Oral, Daily               Thank you for allowing me to participate in the care of your patient,      Sincerely,   Jamaal Salinas Jr, MD  [facility]  01/17/25  11:43 EST

## 2025-01-21 DIAGNOSIS — E34.9 TESTOSTERONE DEFICIENCY: Primary | ICD-10-CM

## 2025-01-22 ENCOUNTER — PRIOR AUTHORIZATION (OUTPATIENT)
Dept: FAMILY MEDICINE CLINIC | Facility: CLINIC | Age: 45
End: 2025-01-22
Payer: COMMERCIAL

## 2025-01-22 NOTE — TELEPHONE ENCOUNTER
PA initiated for Testosterone Enanthate 100 MG/0.5ML solution auto-injector via [employer]. Awaiting response.

## 2025-01-22 NOTE — TELEPHONE ENCOUNTER
PA response received: The Requested qty is not covered by the plan.    Will contact the patient's pharmacy upon opening to request further information. Potentially it is a covered benefit when dispensed differently.

## 2025-01-22 NOTE — TELEPHONE ENCOUNTER
Attempted to contact patient's pharmacy for clarification. Was unable to speak with a pharmacist or technician directly, left voicemail for someone to return my call to discuss potential quantity changes that could result in coverage from the patient's insurance.

## 2025-01-24 ENCOUNTER — DOCUMENTATION (OUTPATIENT)
Dept: FAMILY MEDICINE CLINIC | Facility: CLINIC | Age: 45
End: 2025-01-24
Payer: COMMERCIAL

## 2025-01-24 RX ORDER — RABEPRAZOLE SODIUM 20 MG/1
20 TABLET, DELAYED RELEASE ORAL DAILY
Qty: 90 TABLET | Refills: 3 | Status: SHIPPED | OUTPATIENT
Start: 2025-01-24

## 2025-02-04 DIAGNOSIS — F90.9 ADULT ADHD: ICD-10-CM

## 2025-02-04 RX ORDER — METHYLPHENIDATE HYDROCHLORIDE 10 MG/1
10 CAPSULE, EXTENDED RELEASE ORAL 2 TIMES DAILY
Qty: 60 CAPSULE | Refills: 0 | Status: SHIPPED | OUTPATIENT
Start: 2025-02-04

## 2025-02-06 DIAGNOSIS — F90.9 ADULT ADHD: ICD-10-CM

## 2025-02-06 RX ORDER — METHYLPHENIDATE HYDROCHLORIDE 10 MG/1
10 CAPSULE, EXTENDED RELEASE ORAL 2 TIMES DAILY
Qty: 60 CAPSULE | Refills: 0 | OUTPATIENT
Start: 2025-02-06

## 2025-02-11 ENCOUNTER — OFFICE VISIT (OUTPATIENT)
Facility: HOSPITAL | Age: 45
End: 2025-02-11
Payer: COMMERCIAL

## 2025-02-11 VITALS
DIASTOLIC BLOOD PRESSURE: 84 MMHG | HEIGHT: 64 IN | WEIGHT: 132 LBS | SYSTOLIC BLOOD PRESSURE: 148 MMHG | BODY MASS INDEX: 22.53 KG/M2 | HEART RATE: 88 BPM | OXYGEN SATURATION: 97 %

## 2025-02-11 DIAGNOSIS — G47.33 OBSTRUCTIVE SLEEP APNEA: Primary | ICD-10-CM

## 2025-02-11 PROCEDURE — G0463 HOSPITAL OUTPT CLINIC VISIT: HCPCS

## 2025-02-11 NOTE — PROGRESS NOTES
"Follow Up Sleep Disorders Center Note     Chief Complaint:  TWYLA     Primary Care Physician: Yossi Vincent MD    Interval History:   The patient is a 44 y.o. male  who was last seen in the sleep lab: 2/7/2024.  Presents today for annual follow-up of TWYLA.   2021 HST showed AHI 6.2 supine AHI 9.3; advised auto CPAP then changed to set pressure of 9.  Uses to me different machines.  At last visit discussed talking to ENT about possibly repairing deviated septum and consider workup for sinus issues.  Is on methylphenidate via PCP for ADHD.  Did have her machine at his girlfriend's house however has data on another machine which she uses every night.    Downloaded PAP Data Reviewed For Compliance:  DME is fully.  Downloads between 1/11/2025 - 2/9/2025.  Average usage is 3 hours 44 minutes.  Average AHI is 1.3.  Average auto CPAP pressure is 9 cm H2O    I have reviewed the above results and compared them with the patient's last downloads and reviewed with the patient.    Review of Systems:    A complete review of systems was done and all were negative with the exception of anxiety acid reflux nasal congestion    Social History:    Social History     Socioeconomic History    Marital status: Unknown   Tobacco Use    Smoking status: Never     Passive exposure: Never    Smokeless tobacco: Never   Vaping Use    Vaping status: Never Used   Substance and Sexual Activity    Alcohol use: Not Currently    Drug use: Never    Sexual activity: Yes     Partners: Female     Birth control/protection: None       Allergies:  Fish-derived products, Sulfa antibiotics, and Latex     Medication Review:  Reviewed.      Vital Signs:    Vitals:    02/11/25 0700   BP: 148/84   Pulse: 88   SpO2: 97%   Weight: 59.9 kg (132 lb)   Height: 162.6 cm (64\")     Body mass index is 22.66 kg/m².    Physical Exam:    Constitutional:  Well developed 44 y.o. male that appears in no apparent distress.  Awake & oriented times 3.  Normal mood with normal " recent and remote memory and normal judgement.  Eyes:  Conjunctivae normal.  Oropharynx: Previously, moist mucous membranes.    Self-administered Bozrah Sleepiness Scale test results:  13  0-5 Lower normal daytime sleepiness  6-10 Higher normal daytime sleepiness  11-12 Mild, 13-15 Moderate, & 16-24 Severe excessive daytime sleepiness    Impression:   1. Obstructive sleep apnea        Obstructive sleep apnea adequately treated with CPAP. The patient appears to be at goal with good compliance and usage. .    Plan:  Good sleep hygiene measures should be maintained.  Normal body weight by Body mass index is 22.66 kg/m²..      After evaluating the patient and assessing results available, the patient is benefiting from the treatment being provided.     The patient will continue CPAP.  After clinical evaluation and review of downloads, I recommend no changes to the patient's pressures.  A new prescription will be sent to the patient's DME.    Caution during activities that require prolonged concentration is strongly advised if sleepiness returns. Changing of PAP supplies regularly is important for effective use. Patient needs to change cushion on the mask or plugs on nasal pillows along with disposable filters once every month and change mask frame, tubing, headgear and Velcro straps every 6 months at the minimum.    I answered all of the patient's questions.  The patient will call for any problems and will follow up in 1 year.      Suzan Roberts MD  Sleep Medicine  02/11/25  09:24 EST

## 2025-02-13 DIAGNOSIS — F90.9 ADULT ADHD: Primary | ICD-10-CM

## 2025-02-13 RX ORDER — METHYLPHENIDATE HYDROCHLORIDE EXTENDED RELEASE 10 MG/1
10 TABLET ORAL 2 TIMES DAILY
Qty: 60 TABLET | Refills: 0 | Status: SHIPPED | OUTPATIENT
Start: 2025-02-13

## 2025-04-18 DIAGNOSIS — N32.81 DETRUSOR MUSCLE HYPERTONIA: ICD-10-CM

## 2025-04-18 RX ORDER — MIRABEGRON 50 MG/1
50 TABLET, FILM COATED, EXTENDED RELEASE ORAL DAILY
Qty: 90 TABLET | Refills: 3 | Status: SHIPPED | OUTPATIENT
Start: 2025-04-18

## 2025-05-14 ENCOUNTER — PATIENT ROUNDING (BHMG ONLY) (OUTPATIENT)
Dept: FAMILY MEDICINE CLINIC | Facility: CLINIC | Age: 45
End: 2025-05-14
Payer: COMMERCIAL

## 2025-05-15 DIAGNOSIS — E34.9 TESTOSTERONE DEFICIENCY: ICD-10-CM

## 2025-05-15 NOTE — TELEPHONE ENCOUNTER
LAST REFILL - 01/21/25  LAST VISIT - 05/15/24  NEXT VISIT - not scheduled    Patient should not be due for a refill, per EMR. 2mL pen pended with 0 refills, added to sig appt needed for further refills.

## 2025-06-20 RX ORDER — TELMISARTAN 20 MG/1
20 TABLET ORAL DAILY
Qty: 90 TABLET | Refills: 3 | Status: SHIPPED | OUTPATIENT
Start: 2025-06-20 | End: 2025-06-23 | Stop reason: SDUPTHER

## 2025-06-23 DIAGNOSIS — I10 PRIMARY HYPERTENSION: Primary | ICD-10-CM

## 2025-06-23 RX ORDER — TELMISARTAN 20 MG/1
20 TABLET ORAL DAILY
Qty: 90 TABLET | Refills: 3 | Status: SHIPPED | OUTPATIENT
Start: 2025-06-23

## 2025-07-03 ENCOUNTER — TELEPHONE (OUTPATIENT)
Dept: CARDIOLOGY | Age: 45
End: 2025-07-03
Payer: COMMERCIAL

## 2025-07-03 NOTE — TELEPHONE ENCOUNTER
Dilcia,    This is a patient of Dr. Aguilera. He cut his leg a couple weeks ago and had a vasovagal response that sent him to ER. When he left ER, they had put him on telmisartan for HTN.     Since then he has had some vague symptoms. But they are troubling him. His arms bother him and he rubs them all the time, he feels like his hands are swollen often and he has been having vision changes.     I looked for an appt next week, but pt could not come to the few we have open. Do you have any recommendations I could give him over the phone? He has been following closely with his PCP and hasn't seemed to get any relief so far.    Thank you,    Zuleika Hernandez, KIAN  Triage [facility]  07/03/25 14:33 EDT    
If he is concerned that he is having a reaction to telmisartan, he may stop it.  A different medication can be prescribed but I would first make sure that his symptoms improve after stopping telmisartan before starting anything else.  I recommend scheduling the next appointment that he is able to come to  
Notified pt of recommendations from Dilcia. Scheduled to see Love for first available next week. Pt verbalized understanding.    Thank you,    Zuleika Hernandez, RN  [place] [facility]  07/03/25 15:48 EDT  
Yes

## 2025-07-07 DIAGNOSIS — F90.9 ADULT ADHD: ICD-10-CM

## 2025-07-07 RX ORDER — METHYLPHENIDATE HYDROCHLORIDE EXTENDED RELEASE 10 MG/1
10 TABLET ORAL 2 TIMES DAILY
Qty: 60 TABLET | Refills: 0 | Status: SHIPPED | OUTPATIENT
Start: 2025-07-07

## 2025-07-09 ENCOUNTER — OFFICE VISIT (OUTPATIENT)
Age: 45
End: 2025-07-09
Payer: COMMERCIAL

## 2025-07-09 VITALS
DIASTOLIC BLOOD PRESSURE: 90 MMHG | BODY MASS INDEX: 23.15 KG/M2 | HEIGHT: 64 IN | HEART RATE: 74 BPM | SYSTOLIC BLOOD PRESSURE: 128 MMHG | WEIGHT: 135.6 LBS

## 2025-07-09 DIAGNOSIS — R00.2 PALPITATIONS: ICD-10-CM

## 2025-07-09 DIAGNOSIS — I38 HEART VALVE REGURGITATION: Primary | ICD-10-CM

## 2025-07-09 DIAGNOSIS — I10 PRIMARY HYPERTENSION: ICD-10-CM

## 2025-07-09 PROCEDURE — 99214 OFFICE O/P EST MOD 30 MIN: CPT | Performed by: NURSE PRACTITIONER

## 2025-07-09 PROCEDURE — 93000 ELECTROCARDIOGRAM COMPLETE: CPT | Performed by: NURSE PRACTITIONER

## 2025-07-09 RX ORDER — METOPROLOL SUCCINATE 25 MG/1
25 TABLET, EXTENDED RELEASE ORAL DAILY
Qty: 30 TABLET | Refills: 0 | Status: SHIPPED | OUTPATIENT
Start: 2025-07-09

## 2025-07-09 NOTE — PROGRESS NOTES
Subjective:     Encounter Date:07/09/2025      Patient ID: Alexander Khalil is a 44 y.o. male.    Chief Complaint:follow up hypertension  History of Present Illness  This is a 44-year-old transgender male who follows with Dr. Salinas and is new to me today.  He has a past medical history of hormone replacement therapy, ADHD and family history of ischemic heart disease.  He has had a coronary calcium score in the past of 0.  He was last seen in the office by Dr. Salinas in January 2020 follow-up and was feeling well overall at that time.  He did report some precordial chest discomfort with a normal calcium score it was felt that it was likely noncardiac related.    A few weeks ago he cut his leg and had a vasovagal response that sent him to the ER.  In the ER he was noted to be hypertensive and was started on telmisartan.  He contacted the office on July 3 to report that he was having some vague symptoms with the telmisartan.  He reported feeling like his hands were swollen and his arms were bothering him.  He was scheduled to come in to see me today.  He reports that when he was started on telmisartan his blood pressure initially improved.  He had been running in the 140s over 90s at home and his blood pressures improved to normal.  BP then started to go up again despite the telmisartan he started having symptoms concerning for side effects.  He ultimately stopped the telmisartan and coincidentally his blood pressure got better.  He is 128/90 today in the office.  He denies any chest pain or shortness of breath.  He does report some palpitations that he feels is likely anxiety related.  He is wondering if he would be better served to take a beta-blocker to help with both his blood pressure as well as his palpitations.    I have reviewed and updated as appropriate allergies, current medications, past family history, past medical history, past surgical history and problem list.   Review of Systems   Constitutional:  Negative for fever, malaise/fatigue, weight gain and weight loss.   HENT:  Negative for congestion, hoarse voice and sore throat.    Eyes:  Negative for blurred vision and double vision.   Cardiovascular:  Positive for palpitations. Negative for chest pain, dyspnea on exertion, leg swelling, orthopnea and syncope.   Respiratory:  Negative for cough, shortness of breath and wheezing.    Gastrointestinal:  Negative for abdominal pain, hematemesis, hematochezia and melena.   Genitourinary:  Negative for dysuria and hematuria.   Neurological:  Negative for dizziness, headaches, light-headedness and numbness.   Psychiatric/Behavioral:  Negative for depression. The patient is nervous/anxious.          Current Outpatient Medications:     buPROPion XL (WELLBUTRIN XL) 150 MG 24 hr tablet, Take 1 tablet by mouth Daily., Disp: 90 tablet, Rfl: 3    fexofenadine (ALLEGRA) 180 MG tablet, Take 1 tablet by mouth Daily., Disp: , Rfl:     finasteride (PROPECIA) 1 MG tablet, Take 1 tablet by mouth Daily., Disp: 90 tablet, Rfl: 3    fluticasone (FLONASE) 50 MCG/ACT nasal spray, 2 sprays into the nostril(s) as directed by provider Daily., Disp: , Rfl:     ketoconazole (NIZORAL) 2 % shampoo, APPLY TO FACE DAILY, LET SIT FOR 5 MINUTES THEN RINSE, Disp: , Rfl:     methylphenidate ER (METADATE ER) 10 MG CR tablet, Take 1 tablet by mouth 2 (Two) Times a Day. <<APPOINTMENT NEEDED FOR FURTHER REFILLS>>, Disp: 60 tablet, Rfl: 0    Mirabegron ER (MYRBETRIQ) 50 MG tablet sustained-release 24 hour 24 hr tablet, TAKE 1 TABLET BY MOUTH DAILY, Disp: 90 tablet, Rfl: 3    ondansetron ODT (ZOFRAN-ODT) 4 MG disintegrating tablet, Place 1 tablet on the tongue Every 8 (Eight) Hours As Needed for Nausea or Vomiting., Disp: 12 tablet, Rfl: 1    RABEprazole (ACIPHEX) 20 MG EC tablet, Take 1 tablet by mouth Daily., Disp: 90 tablet, Rfl: 3    telmisartan (MICARDIS) 20 MG tablet, Take 1 tablet by mouth Daily., Disp: 90 tablet, Rfl: 3    Testosterone Enanthate  100 MG/0.5ML solution auto-injector, Inject 0.5 mL under the skin into the appropriate area as directed 1 (One) Time Per Week. <<APPOINTMENT NEEDED FOR FURTHER REFILLS>>, Disp: 2 mL, Rfl: 0    metoprolol succinate XL (TOPROL-XL) 25 MG 24 hr tablet, Take 1 tablet by mouth Daily., Disp: 30 tablet, Rfl: 0    Current Facility-Administered Medications:     Testosterone Undecanoate solution 3 mL, 3 mL, Intramuscular, Q3 Months, Yossi Vincent MD, 3 mL at 03/05/24 1233    Past Medical History:   Diagnosis Date    ADHD (attention deficit hyperactivity disorder)     Anxiety     Compulsive disorder     Depression     Gender dysphoria     GERD (gastroesophageal reflux disease)     Headache 2010    History of medical problems 2001    OCD    Irritable bowel syndrome 2007    Neurogenic bladder        Past Surgical History:   Procedure Laterality Date    HYSTERECTOMY      MASTECTOMY Bilateral     UPPER GASTROINTESTINAL ENDOSCOPY  2016    negative per pt       Family History   Problem Relation Age of Onset    Heart disease Mother     Atrial fibrillation Mother     Mitral valve prolapse Mother     Heart failure Mother     Uterine cancer Mother     Anxiety disorder Mother     Cancer Mother     Hyperlipidemia Mother     Miscarriages / Stillbirths Mother         Lost several pregnancies    Heart attack Father     Skin cancer Father     COPD Father     Heart disease Father     Hyperlipidemia Father     Other Father         OCD    Hearing loss Father     Skin cancer Brother     Other Brother         OCD    Skin cancer Paternal Grandmother     Heart failure Paternal Grandmother     Arthritis Paternal Grandmother     Depression Paternal Grandmother     Hearing loss Paternal Grandmother     Heart disease Paternal Grandmother     Other Paternal Grandmother         OCD    Alcohol abuse Maternal Uncle     Liver disease Maternal Uncle     Alcohol abuse Maternal Uncle     Liver disease Maternal Uncle     Anxiety disorder Daughter      "Learning disabilities Daughter         ADHD    Anxiety disorder Sister     Anxiety disorder Maternal Grandmother     Developmental Disability Daughter     Learning disabilities Daughter         Intellectual disability    Vision loss Paternal Grandfather        Social History     Tobacco Use    Smoking status: Never     Passive exposure: Never    Smokeless tobacco: Never   Vaping Use    Vaping status: Never Used   Substance Use Topics    Alcohol use: Not Currently    Drug use: Never         ECG 12 Lead    Date/Time: 7/9/2025 4:07 PM  Performed by: Love Bennett APRN    Authorized by: Love Bennett APRN  Comparison: compared with previous ECG from 7/5/2024  Similar to previous ECG  Rhythm: sinus rhythm  Other findings: non-specific ST-T wave changes             Objective:     Visit Vitals  /90 (BP Location: Left arm, Patient Position: Sitting, Cuff Size: Adult)   Pulse 74   Ht 162.6 cm (64.02\")   Wt 61.5 kg (135 lb 9.6 oz)   BMI 23.26 kg/m²             Physical Exam  Constitutional:       Appearance: Normal appearance. He is normal weight.   HENT:      Head: Normocephalic.   Neck:      Vascular: No carotid bruit.   Cardiovascular:      Rate and Rhythm: Normal rate and regular rhythm.      Chest Wall: PMI is not displaced.      Pulses: Normal pulses.           Radial pulses are 2+ on the right side and 2+ on the left side.        Posterior tibial pulses are 2+ on the right side and 2+ on the left side.      Heart sounds: Normal heart sounds. No murmur heard.     No friction rub. No gallop.   Pulmonary:      Effort: Pulmonary effort is normal.      Breath sounds: Normal breath sounds.   Abdominal:      General: Bowel sounds are normal. There is no distension.      Palpations: Abdomen is soft.   Musculoskeletal:      Right lower leg: No edema.      Left lower leg: No edema.   Skin:     General: Skin is warm and dry.      Capillary Refill: Capillary refill takes less than 2 seconds.   Neurological:      Mental " Status: He is alert and oriented to person, place, and time.   Psychiatric:         Mood and Affect: Mood normal.         Behavior: Behavior normal.         Thought Content: Thought content normal.          Lab Review:         Cardiac Procedures:       Assessment:         Diagnoses and all orders for this visit:    1. Heart valve regurgitation (Primary)    Other orders  -     metoprolol succinate XL (TOPROL-XL) 25 MG 24 hr tablet; Take 1 tablet by mouth Daily.  Dispense: 30 tablet; Refill: 0  -     ECG 12 Lead            Plan:       HTN: blood pressure actually looks pretty good despite being off telmisartan. We discussed other options vs just monitoring BP at home for a week or two. He expressed interest in trying a beta blocker to see if this would not only help his BP but also palpitations/anxiety. I think this is reasonable. Will start with Toprol XL. Instructed to try taking it in the morning but if this causes too much fatigue, would recommend switching to nighttime.  Palpitations: as above in #1  ADHD    Thank you for allowing me to participate in this patient's care. Please call with any questions or concerns. Mr. Khalil will follow up with Dr. Salinas as scheduled in January.           Your medication list            Accurate as of July 9, 2025  4:08 PM. If you have any questions, ask your nurse or doctor.                START taking these medications        Instructions Last Dose Given Next Dose Due   metoprolol succinate XL 25 MG 24 hr tablet  Commonly known as: TOPROL-XL  Started by: Love Bennett      Take 1 tablet by mouth Daily.              CONTINUE taking these medications        Instructions Last Dose Given Next Dose Due   buPROPion  MG 24 hr tablet  Commonly known as: WELLBUTRIN XL      Take 1 tablet by mouth Daily.       fexofenadine 180 MG tablet  Commonly known as: ALLEGRA      Take 1 tablet by mouth Daily.       finasteride 1 MG tablet  Commonly known as: PROPECIA      Take 1 tablet by  mouth Daily.       fluticasone 50 MCG/ACT nasal spray  Commonly known as: FLONASE      2 sprays into the nostril(s) as directed by provider Daily.       ketoconazole 2 % shampoo  Commonly known as: NIZORAL      APPLY TO FACE DAILY, LET SIT FOR 5 MINUTES THEN RINSE       methylphenidate ER 10 MG CR tablet  Commonly known as: METADATE ER      Take 1 tablet by mouth 2 (Two) Times a Day. <<APPOINTMENT NEEDED FOR FURTHER REFILLS>>       Mirabegron ER 50 MG tablet sustained-release 24 hour 24 hr tablet  Commonly known as: MYRBETRIQ      TAKE 1 TABLET BY MOUTH DAILY       ondansetron ODT 4 MG disintegrating tablet  Commonly known as: ZOFRAN-ODT      Place 1 tablet on the tongue Every 8 (Eight) Hours As Needed for Nausea or Vomiting.       RABEprazole 20 MG EC tablet  Commonly known as: ACIPHEX      Take 1 tablet by mouth Daily.       telmisartan 20 MG tablet  Commonly known as: MICARDIS      Take 1 tablet by mouth Daily.       Testosterone Enanthate 100 MG/0.5ML solution auto-injector      Inject 0.5 mL under the skin into the appropriate area as directed 1 (One) Time Per Week. <<APPOINTMENT NEEDED FOR FURTHER REFILLS>>                 Where to Get Your Medications        These medications were sent to [id number] - [place], [place] - [address]. - [phone]  - [phone] [address], [facility] [address]      Phone: [phone]   metoprolol succinate XL 25 MG 24 hr tablet           LINDA Landon  07/09/25  2:12 PM EDT

## 2025-07-21 ENCOUNTER — TELEPHONE (OUTPATIENT)
Dept: FAMILY MEDICINE CLINIC | Facility: CLINIC | Age: 45
End: 2025-07-21
Payer: COMMERCIAL

## 2025-07-21 NOTE — TELEPHONE ENCOUNTER
Patient called to schedule follow up blood pressure check with Dr. Vincent, patient saw a cardiologist on 7/9/25 who gave him Toprol XL 25mg, patient only wants to see Dr. Vincent where can I put him

## 2025-07-22 NOTE — TELEPHONE ENCOUNTER
Please call and r/s patient's appointment. Patient was most recently added for an appointment tomorrow, putting Dr. Vincent over his 22 patient per day limit. Can take a 15 minute spot that does not overbook PCP. Thank you

## 2025-07-30 ENCOUNTER — OFFICE VISIT (OUTPATIENT)
Dept: FAMILY MEDICINE CLINIC | Facility: CLINIC | Age: 45
End: 2025-07-30
Payer: COMMERCIAL

## 2025-07-30 VITALS
OXYGEN SATURATION: 98 % | BODY MASS INDEX: 23.39 KG/M2 | DIASTOLIC BLOOD PRESSURE: 90 MMHG | RESPIRATION RATE: 18 BRPM | SYSTOLIC BLOOD PRESSURE: 122 MMHG | HEART RATE: 58 BPM | HEIGHT: 64 IN | WEIGHT: 137 LBS

## 2025-07-30 DIAGNOSIS — F90.9 ADULT ADHD: ICD-10-CM

## 2025-07-30 DIAGNOSIS — E34.9 TESTOSTERONE DEFICIENCY: ICD-10-CM

## 2025-07-30 DIAGNOSIS — R29.898 PELVIC GIRDLE WEAKNESS: ICD-10-CM

## 2025-07-30 DIAGNOSIS — I10 PRIMARY HYPERTENSION: Primary | ICD-10-CM

## 2025-07-30 DIAGNOSIS — Z51.81 THERAPEUTIC DRUG MONITORING: ICD-10-CM

## 2025-07-30 DIAGNOSIS — E34.9 HORMONE IMBALANCE: ICD-10-CM

## 2025-07-30 DIAGNOSIS — F41.1 GENERALIZED ANXIETY DISORDER: ICD-10-CM

## 2025-07-30 DIAGNOSIS — M54.16 LUMBAR RADICULOPATHY: ICD-10-CM

## 2025-07-30 RX ORDER — METHYLPHENIDATE HYDROCHLORIDE EXTENDED RELEASE 10 MG/1
10 TABLET ORAL 2 TIMES DAILY
Qty: 60 TABLET | Refills: 0 | Status: SHIPPED | OUTPATIENT
Start: 2025-07-30

## 2025-07-30 NOTE — PROGRESS NOTES
Chief Complaint  Hypertension    Subjective    History of Present Illness  History of Present Illness  The patient presents for evaluation of hypertension, anxiety, ADHD, lower back pain, and testosterone deficiency.    He reports that his blood pressure has been consistently high, with readings around 165/100, causing him to feel unwell. Despite starting telmisartan, his blood pressure remained elevated at approximately 145/95. He suspects that his ADHD medication and Wellbutrin may be contributing to his hypertension, as he noticed a gradual increase in his blood pressure over time. There was a period when his blood pressure spiked to the 160s, during which he experienced severe discomfort, including a sensation of blood pooling in his hands. He also reports feeling stressed and uncomfortable, but not panicky. He does not frequently monitor his blood pressure due to discomfort from the cuff. He is currently on a beta blocker, which has helped lower his blood pressure. He discontinued his ADHD medication when he started the beta blocker but resumed it due to dissociation. He is currently on a low dose of methylphenidate.    He experiences numbness in his heel upon waking up each morning and has been diagnosed with mild arthritis in his lower back, which sometimes prevents him from bending his back. He has been told that he has tarsal tunnel syndrome and that his pelvis is rotated. Has definitely noticed that the numbness is positional. Has been recommended physical therapy in the past, would like to try it again now.    He is scheduled for a septoplasty due to a broken nose and bone spurs, which are affecting his breathing.    He often misses his Xyosted testosterone injections, stretching the 7-day interval to 10 days, and is usually low on testosterone. He is considering using an autoinjector or nasal spray for his testosterone replacement therapy. He believes his cholesterol and triglyceride levels may be high  "and reports kidney pain. He is curious if his new blood pressure medication could affect his kidney function.    Objective     Vital Signs:   /90   Pulse 58   Resp 18   Ht 162.6 cm (64.02\")   Wt 62.1 kg (137 lb)   SpO2 98%   BMI 23.50 kg/m²   Physical Exam  Vitals and nursing note reviewed.   Constitutional:       General: He is not in acute distress.     Appearance: Normal appearance. He is not ill-appearing.   Cardiovascular:      Rate and Rhythm: Normal rate and regular rhythm.      Pulses: Normal pulses.      Heart sounds: Normal heart sounds. No murmur heard.  Pulmonary:      Effort: Pulmonary effort is normal. No respiratory distress.      Breath sounds: Normal breath sounds. No rales.   Neurological:      Mental Status: He is alert and oriented to person, place, and time. Mental status is at baseline.   Psychiatric:         Mood and Affect: Mood normal.         Behavior: Behavior normal.            Assessment and Plan   Diagnoses and all orders for this visit:    1. Primary hypertension (Primary)    2. Generalized anxiety disorder    3. Adult ADHD  -     methylphenidate ER (METADATE ER) 10 MG CR tablet; Take 1 tablet by mouth 2 (Two) Times a Day.  Dispense: 60 tablet; Refill: 0    4. Lumbar radiculopathy  -     Ambulatory Referral to Physical Therapy for Evaluation & Treatment    5. Pelvic girdle weakness  -     Ambulatory Referral to Physical Therapy for Evaluation & Treatment    6. Testosterone deficiency  -     Testosterone  -     CBC (No Diff)  -     Comprehensive Metabolic Panel  -     Lipid Panel  -     Testosterone Enanthate 100 MG/0.5ML solution auto-injector; Inject 0.5 mL under the skin into the appropriate area as directed 1 (One) Time Per Week.  Dispense: 6 mL; Refill: 1    7. Hormone imbalance  -     Testosterone  -     CBC (No Diff)  -     Comprehensive Metabolic Panel  -     Lipid Panel    8. Therapeutic drug monitoring  -     Testosterone  -     CBC (No Diff)  -     Comprehensive " Metabolic Panel  -     Lipid Panel      Assessment & Plan  1. Hypertension.  - His hypertension appears to be anxiety-induced, with metoprolol effectively managing it. However, there is a possibility of underlying hypertension.  - If his blood pressure increases despite metoprolol use, a switch from telmisartan to another long-term antihypertensive medication may be considered.    2. Anxiety.  - The possibility of adding a low-dose SSRI was discussed, but he reported intolerance due to bladder issues.  - Buspirone was suggested as an alternative.  - He was also encouraged to consider therapy.    3. ADHD.  - He will continue with the current dosage of methylphenidate.  - The potential impact of ADHD medication on his blood pressure was reviewed.  - Refill as above.    4. Lower back pain and pelvic girdle weakness.  - His lower back pain is likely due to muscular imbalance and pelvic girdle instability, possibly compressing a nerve.  - Physical therapy was recommended to address these issues.  - A referral for physical therapy was provided.    5. Testosterone deficiency.  - He was informed about the availability of testosterone autoinjectors and oral pills, but also about their high cost and potential insurance coverage issues.  - A lab order was provided for him to complete at his convenience.  - He was advised to monitor his testosterone levels and consider self-injection if feasible.    Recommended follow up as below. Encouraged communication via [employer] in the meantime.     Patient was given instructions and counseling regarding his condition or for health maintenance advice. Please see specific information pulled into the AVS (placed there by myself) if appropriate.    Return in about 3 months (around 10/30/2025), or if symptoms worsen or fail to improve, for Preventive Health Maintenance.    Patient or patient representative verbalized consent for the use of Ambient Listening during the visit with  Yossi Edwards  MD Melisa for chart documentation. 7/30/2025  16:32 EDT    ARELIS Vincent MD

## 2025-08-04 DIAGNOSIS — K21.9 GASTROESOPHAGEAL REFLUX DISEASE WITHOUT ESOPHAGITIS: Primary | ICD-10-CM

## 2025-08-04 DIAGNOSIS — F41.1 GENERALIZED ANXIETY DISORDER: ICD-10-CM

## 2025-08-04 DIAGNOSIS — N32.81 DETRUSOR MUSCLE HYPERTONIA: ICD-10-CM

## 2025-08-04 DIAGNOSIS — L65.9 HAIR LOSS: ICD-10-CM

## 2025-08-04 RX ORDER — MIRABEGRON 50 MG/1
50 TABLET, FILM COATED, EXTENDED RELEASE ORAL DAILY
Qty: 90 TABLET | Refills: 3 | Status: SHIPPED | OUTPATIENT
Start: 2025-08-04

## 2025-08-04 RX ORDER — FINASTERIDE 1 MG/1
1 TABLET, FILM COATED ORAL DAILY
Qty: 90 TABLET | Refills: 3 | Status: SHIPPED | OUTPATIENT
Start: 2025-08-04

## 2025-08-04 RX ORDER — BUPROPION HYDROCHLORIDE 150 MG/1
150 TABLET ORAL DAILY
Qty: 90 TABLET | Refills: 3 | Status: SHIPPED | OUTPATIENT
Start: 2025-08-04

## 2025-08-04 RX ORDER — METOPROLOL SUCCINATE 25 MG/1
25 TABLET, EXTENDED RELEASE ORAL DAILY
Qty: 90 TABLET | Refills: 1 | Status: SHIPPED | OUTPATIENT
Start: 2025-08-04

## 2025-08-04 RX ORDER — RABEPRAZOLE SODIUM 20 MG/1
20 TABLET, DELAYED RELEASE ORAL DAILY
Qty: 90 TABLET | Refills: 3 | Status: SHIPPED | OUTPATIENT
Start: 2025-08-04

## 2025-08-04 RX ORDER — FEXOFENADINE HCL 180 MG/1
180 TABLET ORAL DAILY
Qty: 90 TABLET | Refills: 1 | Status: SHIPPED | OUTPATIENT
Start: 2025-08-04